# Patient Record
Sex: MALE | Race: WHITE | NOT HISPANIC OR LATINO | Employment: UNEMPLOYED | URBAN - METROPOLITAN AREA
[De-identification: names, ages, dates, MRNs, and addresses within clinical notes are randomized per-mention and may not be internally consistent; named-entity substitution may affect disease eponyms.]

---

## 2023-05-08 LAB
ERYTHROCYTE DISTRIBUTION WIDTH (RATIO) BY AUTOMATED COUNT: 15.1 % (ref 11.5–14.5)
ERYTHROCYTE MEAN CORPUSCULAR HEMOGLOBIN CONCENTRATION (G/DL) BY AUTOMATED: 32.3 G/DL (ref 32–36)
ERYTHROCYTE MEAN CORPUSCULAR VOLUME (FL) BY AUTOMATED COUNT: 93 FL (ref 80–100)
ERYTHROCYTES (10*6/UL) IN BLOOD BY AUTOMATED COUNT: 4.54 X10E12/L (ref 4.5–5.9)
ESTIMATED AVERAGE GLUCOSE FOR HBA1C: 105 MG/DL
HEMATOCRIT (%) IN BLOOD BY AUTOMATED COUNT: 42.1 % (ref 41–52)
HEMOGLOBIN (G/DL) IN BLOOD: 13.6 G/DL (ref 13.5–17.5)
HEMOGLOBIN A1C/HEMOGLOBIN TOTAL IN BLOOD: 5.3 %
LEUKOCYTES (10*3/UL) IN BLOOD BY AUTOMATED COUNT: 9.7 X10E9/L (ref 4.4–11.3)
NRBC (PER 100 WBCS) BY AUTOMATED COUNT: 0 /100 WBC (ref 0–0)
PLATELETS (10*3/UL) IN BLOOD AUTOMATED COUNT: 343 X10E9/L (ref 150–450)

## 2023-05-09 LAB
ALANINE AMINOTRANSFERASE (SGPT) (U/L) IN SER/PLAS: 12 U/L (ref 10–52)
ALBUMIN (G/DL) IN SER/PLAS: 4 G/DL (ref 3.4–5)
ALKALINE PHOSPHATASE (U/L) IN SER/PLAS: 154 U/L (ref 33–136)
ANION GAP IN SER/PLAS: 13 MMOL/L (ref 10–20)
ASPARTATE AMINOTRANSFERASE (SGOT) (U/L) IN SER/PLAS: 13 U/L (ref 9–39)
BILIRUBIN TOTAL (MG/DL) IN SER/PLAS: 0.5 MG/DL (ref 0–1.2)
CALCIUM (MG/DL) IN SER/PLAS: 9.6 MG/DL (ref 8.6–10.6)
CARBON DIOXIDE, TOTAL (MMOL/L) IN SER/PLAS: 26 MMOL/L (ref 21–32)
CHLORIDE (MMOL/L) IN SER/PLAS: 108 MMOL/L (ref 98–107)
CHOLESTEROL (MG/DL) IN SER/PLAS: 107 MG/DL (ref 0–199)
CHOLESTEROL IN HDL (MG/DL) IN SER/PLAS: 29 MG/DL
CHOLESTEROL/HDL RATIO: 3.7
CREATININE (MG/DL) IN SER/PLAS: 1.3 MG/DL (ref 0.5–1.3)
GFR MALE: 61 ML/MIN/1.73M2
GLUCOSE (MG/DL) IN SER/PLAS: 94 MG/DL (ref 74–99)
LDL: 54 MG/DL (ref 0–99)
POTASSIUM (MMOL/L) IN SER/PLAS: 4.6 MMOL/L (ref 3.5–5.3)
PROTEIN TOTAL: 6.4 G/DL (ref 6.4–8.2)
SODIUM (MMOL/L) IN SER/PLAS: 142 MMOL/L (ref 136–145)
THYROTROPIN (MIU/L) IN SER/PLAS BY DETECTION LIMIT <= 0.05 MIU/L: 0.88 MIU/L (ref 0.44–3.98)
TRIGLYCERIDE (MG/DL) IN SER/PLAS: 118 MG/DL (ref 0–149)
UREA NITROGEN (MG/DL) IN SER/PLAS: 19 MG/DL (ref 6–23)
VLDL: 24 MG/DL (ref 0–40)

## 2023-08-23 ENCOUNTER — HOSPITAL ENCOUNTER (OUTPATIENT)
Dept: DATA CONVERSION | Facility: HOSPITAL | Age: 63
Discharge: HOME | End: 2023-08-23
Payer: MEDICARE

## 2023-08-23 DIAGNOSIS — M25.572 PAIN IN LEFT ANKLE AND JOINTS OF LEFT FOOT: ICD-10-CM
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
## 2023-08-23 DIAGNOSIS — Z79.01 LONG TERM (CURRENT) USE OF ANTICOAGULANTS: ICD-10-CM

## 2023-08-23 DIAGNOSIS — S93.402A SPRAIN OF UNSPECIFIED LIGAMENT OF LEFT ANKLE, INITIAL ENCOUNTER: ICD-10-CM

## 2023-10-11 DIAGNOSIS — I25.10 CORONARY ARTERY DISEASE INVOLVING NATIVE HEART, UNSPECIFIED VESSEL OR LESION TYPE, UNSPECIFIED WHETHER ANGINA PRESENT: Primary | ICD-10-CM

## 2023-10-11 RX ORDER — ASPIRIN 81 MG/1
81 TABLET ORAL DAILY
Status: SHIPPED | OUTPATIENT
Start: 2023-10-11

## 2023-10-25 ENCOUNTER — TELEPHONE (OUTPATIENT)
Dept: CARDIOLOGY | Facility: CLINIC | Age: 63
End: 2023-10-25
Payer: MEDICARE

## 2023-11-08 DIAGNOSIS — I10 HYPERTENSION: Primary | ICD-10-CM

## 2023-11-08 DIAGNOSIS — E78.5 HYPERLIPIDEMIA: ICD-10-CM

## 2023-11-08 RX ORDER — CARVEDILOL 25 MG/1
25 TABLET ORAL
COMMUNITY
End: 2023-11-08 | Stop reason: SDUPTHER

## 2023-11-08 RX ORDER — CARVEDILOL 25 MG/1
25 TABLET ORAL
Qty: 180 TABLET | Refills: 3 | Status: SHIPPED | OUTPATIENT
Start: 2023-11-08 | End: 2024-11-07

## 2023-11-08 RX ORDER — ATORVASTATIN CALCIUM 80 MG/1
80 TABLET, FILM COATED ORAL DAILY
Qty: 90 TABLET | Refills: 3 | Status: SHIPPED | OUTPATIENT
Start: 2023-11-08 | End: 2024-11-07

## 2023-11-08 RX ORDER — ATORVASTATIN CALCIUM 80 MG/1
80 TABLET, FILM COATED ORAL DAILY
COMMUNITY
End: 2023-11-08 | Stop reason: SDUPTHER

## 2023-12-01 ENCOUNTER — TELEPHONE (OUTPATIENT)
Dept: VASCULAR MEDICINE | Facility: CLINIC | Age: 63
End: 2023-12-01
Payer: MEDICARE

## 2023-12-22 ENCOUNTER — HOSPITAL ENCOUNTER (OUTPATIENT)
Dept: VASCULAR MEDICINE | Facility: CLINIC | Age: 63
Discharge: HOME | End: 2023-12-22
Payer: MEDICARE

## 2023-12-22 DIAGNOSIS — Z95.828 PRESENCE OF OTHER VASCULAR IMPLANTS AND GRAFTS: ICD-10-CM

## 2023-12-22 DIAGNOSIS — I73.9 PERIPHERAL VASCULAR DISEASE, UNSPECIFIED (CMS-HCC): ICD-10-CM

## 2023-12-22 PROCEDURE — 93978 VASCULAR STUDY: CPT

## 2023-12-22 PROCEDURE — 93978 VASCULAR STUDY: CPT | Performed by: INTERNAL MEDICINE

## 2023-12-27 ENCOUNTER — APPOINTMENT (OUTPATIENT)
Dept: VASCULAR SURGERY | Facility: CLINIC | Age: 63
End: 2023-12-27
Payer: MEDICARE

## 2024-01-03 ENCOUNTER — OFFICE VISIT (OUTPATIENT)
Dept: VASCULAR SURGERY | Facility: CLINIC | Age: 64
End: 2024-01-03
Payer: MEDICARE

## 2024-01-03 VITALS
HEIGHT: 72 IN | SYSTOLIC BLOOD PRESSURE: 128 MMHG | WEIGHT: 197 LBS | DIASTOLIC BLOOD PRESSURE: 88 MMHG | BODY MASS INDEX: 26.68 KG/M2 | HEART RATE: 80 BPM | OXYGEN SATURATION: 96 %

## 2024-01-03 DIAGNOSIS — Z95.828 HX OF AORTO-FEMORAL BYPASS: Primary | ICD-10-CM

## 2024-01-03 DIAGNOSIS — I73.9 PAD (PERIPHERAL ARTERY DISEASE) (CMS-HCC): ICD-10-CM

## 2024-01-03 PROCEDURE — 99213 OFFICE O/P EST LOW 20 MIN: CPT | Performed by: NURSE PRACTITIONER

## 2024-01-03 PROCEDURE — 1126F AMNT PAIN NOTED NONE PRSNT: CPT | Performed by: NURSE PRACTITIONER

## 2024-01-03 PROCEDURE — 1159F MED LIST DOCD IN RCRD: CPT | Performed by: NURSE PRACTITIONER

## 2024-01-03 PROCEDURE — 1160F RVW MEDS BY RX/DR IN RCRD: CPT | Performed by: NURSE PRACTITIONER

## 2024-01-03 ASSESSMENT — PAIN SCALES - GENERAL: PAINLEVEL: 0-NO PAIN

## 2024-01-03 NOTE — PATIENT INSTRUCTIONS
Noam, congratulations on cutting back on your smoking!  I hope that you can eventually stop smoking completely.    Your bypasses are open and we will continue to monitor your blood flow.  If you should develop any symptoms, please call us ASAP.    Follow-up in 6 months.

## 2024-01-03 NOTE — PROGRESS NOTES
History Of Present Illness  Dayo Quesada is a 63 y.o. male presenting with a complicated vascular history including aortic reconstruction with a cryo graft secondary to an infected aortobifemoral bypass. He underwent aorto left SFA and aorto right profunda bypass using cryo graft completed in April 2022 by Dr. Garcia. The patient remains on doxycycline daily for suppressive antibiotics.  Patient returns today for routine follow-up.  He denies any symptoms of claudication, ischemic or open wounds or sores.  Of note, patient reports that he continues to cut back on his smoking but has not completely quit yet.       Past Medical History  PAD, tobacco abuse    Surgical History  Past Surgical History:   Procedure Laterality Date    CT ABDOMEN PELVIS ANGIOGRAM W AND/OR WO IV CONTRAST  7/28/2015    CT ABDOMEN PELVIS ANGIOGRAM W AND/OR WO IV CONTRAST [facility]    CT ABDOMEN PELVIS ANGIOGRAM W AND/OR WO IV CONTRAST  11/7/2017    CT ABDOMEN PELVIS ANGIOGRAM W AND/OR WO IV CONTRAST [facility]    CT ABDOMEN PELVIS ANGIOGRAM W AND/OR WO IV CONTRAST  3/24/2022    CT ABDOMEN PELVIS ANGIOGRAM W AND/OR WO IV CONTRAST [facility]    CT AORTA AND BILATERAL ILIOFEMORAL RUNOFF ANGIOGRAM W AND/OR WO IV CONTRAST  4/4/2022    CT AORTA AND BILATERAL ILIOFEMORAL RUNOFF ANGIOGRAM W AND/OR WO IV CONTRAST 4/4/2022 [facility] ANCILLARY LEGACY    CT AORTA AND BILATERAL ILIOFEMORAL RUNOFF ANGIOGRAM W AND/OR WO IV CONTRAST  9/12/2013    CT AORTA AND BILATERAL ILIOFEMORAL RUNOFF ANGIOGRAM W AND/OR WO IV CONTRAST [facility]    CT AORTA AND BILATERAL ILIOFEMORAL RUNOFF ANGIOGRAM W AND/OR WO IV CONTRAST  10/29/2013    CT AORTA AND BILATERAL ILIOFEMORAL RUNOFF ANGIOGRAM W AND/OR WO IV CONTRAST [facility]    CT AORTA AND BILATERAL ILIOFEMORAL RUNOFF ANGIOGRAM W AND/OR WO IV CONTRAST  10/5/2016    CT AORTA AND BILATERAL ILIOFEMORAL RUNOFF ANGIOGRAM W AND/OR WO IV CONTRAST [facility]    CT AORTA AND  BILATERAL ILIOFEMORAL RUNOFF ANGIOGRAM W AND/OR WO IV CONTRAST  12/14/2016    CT AORTA AND BILATERAL ILIOFEMORAL RUNOFF ANGIOGRAM W AND/OR WO IV CONTRAST [facility] INPATIENT LEGACY    CT PELVIS ANGIO W AND WO IV CONTRAST  9/1/2018    CT PELVIS ANGIO W AND WO IV CONTRAST [facility] EMERGENCY LEGACY    IR ANGIOGRAM LOWER EXTREMITY BILATERAL Bilateral 11/5/2015    IR ANGIOGRAM LOWER EXTREMITY BILATERAL [facility] INPATIENT LEGACY    IR ANGIOGRAM LOWER EXTREMITY LEFT Left 10/28/2015    IR ANGIOGRAM LOWER EXTREMITY LEFT [facility]    IR ANGIOGRAM LOWER EXTREMITY LEFT Left 11/10/2017    IR ANGIOGRAM LOWER EXTREMITY LEFT [facility] INPATIENT LEGACY    IR ANGIOGRAM LOWER EXTREMITY LEFT Left 5/3/2019    IR ANGIOGRAM LOWER EXTREMITY LEFT [facility] INPATIENT LEGACY    MR ANGIO ABDOMEN W AND WO IV CONTRAST  5/18/2022    MR ABDOMEN ANGIO W AND WO IV CONTRAST [facility] INPATIENT LEGACY    OTHER SURGICAL HISTORY  07/31/2017    Angioplasty    OTHER SURGICAL HISTORY  07/31/2017    Creation Of Pericardial Window    US GUIDED ABSCESS DRAIN  5/18/2022    US GUIDED ABSCESS DRAIN [facility] INPATIENT LEGACY         Social History  He reports that he has been smoking cigarettes. He does not have any smokeless tobacco history on file. No history on file for alcohol use and drug use.    Family History  No family history on file.     Allergies  Patient has no allergy information on record.    Review of Systems    CONSTITUTIONAL: Denies weight loss, fever and chills.    HEENT: Denies changes in vision and hearing.    RESPIRATORY: Denies SOB and cough.    CV: Denies palpitations and CP.    GI: Denies abdominal pain, nausea, vomiting and diarrhea.    : Denies dysuria and urinary frequency.    MSK: Denies myalgia and joint pain.    SKIN: Denies rash and pruritus.    VASC: Denies claudication, ischemic rest pain, or open wounds or sores    NEUROLOGICAL: Denies headache and syncope.    PSYCHIATRIC: Denies recent changes in mood. Denies anxiety and depression.     Physical  Exam    General: Pleasant, cooperative, alert and oriented x 3.  HEENT: Head is atraumatic, normocephalic. PERRL.  Skin: Warm and dry, no rashes or lesions  Pulses: Palpable radial and brachial pulses bilaterally. Palpable femoral and popliteal pulses, right dorsalis pedis pulse. Palpable left posterior tibial pulse. No open wounds or sores.  Extremities: No edema or cyanosis  Neuro: Moves all extremities spontaneously, no focal deficits  Psych: affect normal, clear speech          Last Recorded Vitals  /88 (BP Location: Right arm, Patient Position: Sitting)   Pulse 80   Wt 89.4 kg (197 lb)   SpO2 96%     Relevant Results    Current Outpatient Medications   Medication Instructions    acetaminophen (TYLENOL) 325 mg, oral, Every 6 hours PRN    atorvastatin (LIPITOR) 80 mg, oral, Daily    carvedilol (COREG) 25 mg, oral, 2 times daily with meals    doxycycline (MONODOX) 100 mg, oral, 2 times daily, Take with at least 8 ounces (large glass) of water, do not lie down for 30 minutes after    methocarbamol (ROBAXIN) 500 mg, oral, 3 times daily PRN    polyethylene glycol (GLYCOLAX, MIRALAX) 17 g, oral, Once    sacubitriL-valsartan (Entresto) 49-51 mg tablet 1 tablet, oral, Every 12 hours    traMADol (ULTRAM) 50 mg, oral, Every 6 hours PRN              Assessment/Plan   Peripheral arterial disease  History of aortic reconstruction with aorto right profunda and aorto left SFA bypass    Peripheral arterial disease-I reviewed the patient's duplex today which noted that the aorto right profunda and aorto left SFA bypasses are patent.  Elevated velocities in the right limb distal anastomosis as well as the outflow artery.  I do not have an JOEL to compare from last study.  Patient asymptomatic at this point.  I did discuss the patient in detail with Dr. Connelly who recommended continued monitoring.  Follow-up in 6 months with an aortic duplex and JOEL.  Patient does take Xarelto, aspirin, and atorvastatin.  Advised to  continue        Jose Farmer, APRN-CNP

## 2024-02-05 DIAGNOSIS — T85.79XA: ICD-10-CM

## 2024-02-05 DIAGNOSIS — I50.9 HEART FAILURE, UNSPECIFIED (MULTI): Primary | ICD-10-CM

## 2024-02-05 RX ORDER — SACUBITRIL AND VALSARTAN 49; 51 MG/1; MG/1
1 TABLET, FILM COATED ORAL EVERY 12 HOURS
Qty: 180 TABLET | Refills: 3 | Status: SHIPPED | OUTPATIENT
Start: 2024-02-05 | End: 2025-02-04

## 2024-02-05 RX ORDER — DOXYCYCLINE 100 MG/1
100 CAPSULE ORAL 2 TIMES DAILY
Qty: 60 CAPSULE | Refills: 1 | Status: SHIPPED | OUTPATIENT
Start: 2024-02-05 | End: 2024-04-01 | Stop reason: SDUPTHER

## 2024-02-05 RX ORDER — DOXYCYCLINE 100 MG/1
100 CAPSULE ORAL 2 TIMES DAILY
COMMUNITY
End: 2024-02-05 | Stop reason: SDUPTHER

## 2024-03-11 ENCOUNTER — APPOINTMENT (OUTPATIENT)
Dept: CARDIOLOGY | Facility: CLINIC | Age: 64
End: 2024-03-11
Payer: MEDICARE

## 2024-03-29 PROBLEM — T82.7XXA VASCULAR GRAFT INFECTION (CMS-HCC): Status: ACTIVE | Noted: 2024-03-29

## 2024-03-29 PROBLEM — E78.5 HYPERLIPIDEMIA: Status: ACTIVE | Noted: 2024-03-29

## 2024-03-29 PROBLEM — T14.8XXA HEMATOMA: Status: ACTIVE | Noted: 2024-03-29

## 2024-03-29 PROBLEM — I25.10 ARTERIOSCLEROSIS OF CORONARY ARTERY: Status: ACTIVE | Noted: 2017-03-22

## 2024-03-29 PROBLEM — M25.511 BILATERAL SHOULDER PAIN: Status: ACTIVE | Noted: 2024-03-29

## 2024-03-29 PROBLEM — S93.409A SPRAIN OF ANKLE: Status: ACTIVE | Noted: 2023-08-23

## 2024-03-29 PROBLEM — F20.81: Status: ACTIVE | Noted: 2024-03-29

## 2024-03-29 PROBLEM — F32.A DEPRESSION: Status: ACTIVE | Noted: 2024-03-29

## 2024-03-29 PROBLEM — I73.9 PERIPHERAL ARTERIAL DISEASE (CMS-HCC): Status: ACTIVE | Noted: 2017-03-22

## 2024-03-29 PROBLEM — R40.1 CLOUDED CONSCIOUSNESS: Status: ACTIVE | Noted: 2024-03-29

## 2024-03-29 PROBLEM — I37.9 PULMONARY VALVE DISORDER: Status: ACTIVE | Noted: 2024-03-29

## 2024-03-29 PROBLEM — I82.409 DVT (DEEP VENOUS THROMBOSIS) (MULTI): Status: ACTIVE | Noted: 2024-03-29

## 2024-03-29 PROBLEM — M25.579 PAIN IN JOINT INVOLVING ANKLE AND FOOT: Status: ACTIVE | Noted: 2023-08-23

## 2024-03-29 PROBLEM — R55 SYNCOPE: Status: ACTIVE | Noted: 2024-03-29

## 2024-03-29 PROBLEM — E88.09 HYPOALBUMINEMIA: Status: ACTIVE | Noted: 2024-03-29

## 2024-03-29 PROBLEM — R06.00 DYSPNEA: Status: ACTIVE | Noted: 2024-03-29

## 2024-03-29 PROBLEM — I25.2 OLD MYOCARDIAL INFARCT: Status: ACTIVE | Noted: 2024-03-29

## 2024-03-29 PROBLEM — M54.9 BACKACHE: Status: ACTIVE | Noted: 2024-03-29

## 2024-03-29 PROBLEM — M25.559 HIP PAIN: Status: ACTIVE | Noted: 2024-03-29

## 2024-03-29 PROBLEM — M79.669 LOWER LEG PAIN: Status: ACTIVE | Noted: 2024-03-29

## 2024-03-29 PROBLEM — R07.9 CHEST PAIN: Status: ACTIVE | Noted: 2024-03-29

## 2024-03-29 PROBLEM — I21.9 MYOCARDIAL INFARCTION (MULTI): Status: ACTIVE | Noted: 2024-03-29

## 2024-03-29 PROBLEM — T82.7XXS: Status: ACTIVE | Noted: 2024-03-29

## 2024-03-29 PROBLEM — I42.9 CARDIOMYOPATHY (MULTI): Status: ACTIVE | Noted: 2024-03-29

## 2024-03-29 PROBLEM — L03.90 CELLULITIS: Status: ACTIVE | Noted: 2024-03-29

## 2024-03-29 PROBLEM — I65.23 CAROTID STENOSIS, ASYMPTOMATIC, BILATERAL: Status: ACTIVE | Noted: 2024-03-29

## 2024-03-29 PROBLEM — I50.22 CHRONIC SYSTOLIC CONGESTIVE HEART FAILURE (MULTI): Status: ACTIVE | Noted: 2017-03-22

## 2024-03-29 PROBLEM — M25.512 BILATERAL SHOULDER PAIN: Status: ACTIVE | Noted: 2024-03-29

## 2024-03-29 PROBLEM — K40.90 HERNIA, INGUINAL: Status: ACTIVE | Noted: 2024-03-29

## 2024-03-29 PROBLEM — R10.9 ABDOMINAL PAIN: Status: ACTIVE | Noted: 2024-03-29

## 2024-03-29 PROBLEM — J18.9 PNEUMONIA: Status: ACTIVE | Noted: 2024-03-29

## 2024-03-29 PROBLEM — R10.30 INGUINAL PAIN: Status: ACTIVE | Noted: 2024-03-29

## 2024-03-29 PROBLEM — I31.39 PERICARDIAL EFFUSION (HHS-HCC): Status: ACTIVE | Noted: 2024-03-29

## 2024-03-29 PROBLEM — T14.8XXA LOCAL INFECTION OF WOUND: Status: ACTIVE | Noted: 2024-03-29

## 2024-03-29 PROBLEM — Z98.890 HISTORY OF CARDIOVASCULAR SURGERY: Status: ACTIVE | Noted: 2024-03-29

## 2024-03-29 PROBLEM — F17.200 NICOTINE DEPENDENCE, UNSPECIFIED, UNCOMPLICATED: Status: ACTIVE | Noted: 2023-05-08

## 2024-03-29 PROBLEM — I70.203: Status: ACTIVE | Noted: 2024-03-29

## 2024-03-29 PROBLEM — R60.9 EDEMA: Status: ACTIVE | Noted: 2024-03-29

## 2024-03-29 PROBLEM — R11.2 NAUSEA & VOMITING: Status: ACTIVE | Noted: 2024-03-29

## 2024-03-29 PROBLEM — T73.3XXA EXHAUSTION DUE TO EXCESSIVE EXERTION: Status: ACTIVE | Noted: 2023-08-23

## 2024-03-29 PROBLEM — Z95.828 HISTORY OF AORTO-FEMORAL BYPASS: Status: ACTIVE | Noted: 2023-12-22

## 2024-03-29 PROBLEM — H93.19 EAR RINGING: Status: ACTIVE | Noted: 2024-03-29

## 2024-03-29 PROBLEM — Z98.61 HISTORY OF PERCUTANEOUS TRANSLUMINAL CORONARY ANGIOPLASTY: Status: ACTIVE | Noted: 2024-03-29

## 2024-03-29 PROBLEM — T85.79XA: Status: ACTIVE | Noted: 2024-03-29

## 2024-03-29 PROBLEM — I10 ESSENTIAL HYPERTENSION: Status: ACTIVE | Noted: 2017-03-22

## 2024-03-29 PROBLEM — R05.9 COUGH: Status: ACTIVE | Noted: 2024-03-29

## 2024-03-29 PROBLEM — Z95.5 PRESENCE OF CORONARY ANGIOPLASTY IMPLANT AND GRAFT: Status: ACTIVE | Noted: 2023-01-20

## 2024-03-29 PROBLEM — R10.9 FLANK PAIN: Status: ACTIVE | Noted: 2024-03-29

## 2024-03-29 PROBLEM — R42 DIZZINESS: Status: ACTIVE | Noted: 2024-03-29

## 2024-03-29 PROBLEM — L08.9 LOCAL INFECTION OF WOUND: Status: ACTIVE | Noted: 2024-03-29

## 2024-03-29 PROBLEM — M54.50 ACUTE LOW BACK PAIN: Status: ACTIVE | Noted: 2024-03-29

## 2024-03-29 RX ORDER — HALOPERIDOL DECANOATE 100 MG/ML
INJECTION INTRAMUSCULAR
COMMUNITY
Start: 2024-01-09

## 2024-03-29 RX ORDER — FLUOXETINE HYDROCHLORIDE 20 MG/1
CAPSULE ORAL
COMMUNITY
Start: 2024-01-30 | End: 2024-05-24 | Stop reason: ALTCHOICE

## 2024-03-29 RX ORDER — OLANZAPINE 15 MG/1
TABLET ORAL
COMMUNITY
Start: 2024-01-18

## 2024-03-29 RX ORDER — RIVAROXABAN 20 MG/1
TABLET, FILM COATED ORAL
COMMUNITY
Start: 2024-01-30 | End: 2024-05-01 | Stop reason: SDUPTHER

## 2024-03-29 RX ORDER — SACUBITRIL AND VALSARTAN 97; 103 MG/1; MG/1
TABLET, FILM COATED ORAL
COMMUNITY
Start: 2024-01-04

## 2024-04-01 ENCOUNTER — OFFICE VISIT (OUTPATIENT)
Dept: CARDIOLOGY | Facility: CLINIC | Age: 64
End: 2024-04-01
Payer: MEDICARE

## 2024-04-01 VITALS
WEIGHT: 207 LBS | BODY MASS INDEX: 28.04 KG/M2 | HEIGHT: 72 IN | HEART RATE: 72 BPM | OXYGEN SATURATION: 99 % | DIASTOLIC BLOOD PRESSURE: 69 MMHG | SYSTOLIC BLOOD PRESSURE: 104 MMHG

## 2024-04-01 DIAGNOSIS — I50.9 CONGESTIVE HEART FAILURE, UNSPECIFIED HF CHRONICITY, UNSPECIFIED HEART FAILURE TYPE (MULTI): Primary | ICD-10-CM

## 2024-04-01 DIAGNOSIS — R94.31 ABNORMAL ELECTROCARDIOGRAM (ECG) (EKG): ICD-10-CM

## 2024-04-01 DIAGNOSIS — T85.79XA: Primary | ICD-10-CM

## 2024-04-01 PROCEDURE — 3074F SYST BP LT 130 MM HG: CPT | Performed by: NURSE PRACTITIONER

## 2024-04-01 PROCEDURE — 1126F AMNT PAIN NOTED NONE PRSNT: CPT | Performed by: NURSE PRACTITIONER

## 2024-04-01 PROCEDURE — 1160F RVW MEDS BY RX/DR IN RCRD: CPT | Performed by: NURSE PRACTITIONER

## 2024-04-01 PROCEDURE — 3078F DIAST BP <80 MM HG: CPT | Performed by: NURSE PRACTITIONER

## 2024-04-01 PROCEDURE — 99214 OFFICE O/P EST MOD 30 MIN: CPT | Performed by: NURSE PRACTITIONER

## 2024-04-01 PROCEDURE — 1159F MED LIST DOCD IN RCRD: CPT | Performed by: NURSE PRACTITIONER

## 2024-04-01 RX ORDER — DOXYCYCLINE 100 MG/1
100 CAPSULE ORAL 2 TIMES DAILY
Qty: 60 CAPSULE | Refills: 1 | OUTPATIENT
Start: 2024-04-01 | End: 2024-05-10

## 2024-04-01 RX ORDER — FLUOXETINE 20 MG/1
TABLET ORAL
COMMUNITY
Start: 2024-03-08

## 2024-04-01 ASSESSMENT — PAIN SCALES - GENERAL: PAINLEVEL: 0-NO PAIN

## 2024-04-01 ASSESSMENT — ENCOUNTER SYMPTOMS
GASTROINTESTINAL NEGATIVE: 1
RESPIRATORY NEGATIVE: 1
CONSTITUTIONAL NEGATIVE: 1
CARDIOVASCULAR NEGATIVE: 1
MUSCULOSKELETAL NEGATIVE: 1
NEUROLOGICAL NEGATIVE: 1

## 2024-04-01 ASSESSMENT — PATIENT HEALTH QUESTIONNAIRE - PHQ9
SUM OF ALL RESPONSES TO PHQ9 QUESTIONS 1 AND 2: 0
1. LITTLE INTEREST OR PLEASURE IN DOING THINGS: NOT AT ALL
2. FEELING DOWN, DEPRESSED OR HOPELESS: NOT AT ALL

## 2024-04-01 ASSESSMENT — COLUMBIA-SUICIDE SEVERITY RATING SCALE - C-SSRS
2. HAVE YOU ACTUALLY HAD ANY THOUGHTS OF KILLING YOURSELF?: NO
1. IN THE PAST MONTH, HAVE YOU WISHED YOU WERE DEAD OR WISHED YOU COULD GO TO SLEEP AND NOT WAKE UP?: NO
6. HAVE YOU EVER DONE ANYTHING, STARTED TO DO ANYTHING, OR PREPARED TO DO ANYTHING TO END YOUR LIFE?: NO

## 2024-04-01 NOTE — PROGRESS NOTES
Chief Complaint:   Follow-up    History Of Present Illness:    .Mr Quesada returns in follow up.  Denies chest pain, sob, palpitations or pedal edema.           Last Recorded Vitals:  Blood pressure 104/69, pulse 72, height 1.829 m (6'), weight 93.9 kg (207 lb), SpO2 99 %.     Past Medical History:  No past medical history on file.     Past Surgical History:  Past Surgical History:   Procedure Laterality Date    CT ABDOMEN PELVIS ANGIOGRAM W AND/OR WO IV CONTRAST  7/28/2015    CT ABDOMEN PELVIS ANGIOGRAM W AND/OR WO IV CONTRAST [facility]    CT ABDOMEN PELVIS ANGIOGRAM W AND/OR WO IV CONTRAST  11/7/2017    CT ABDOMEN PELVIS ANGIOGRAM W AND/OR WO IV CONTRAST [facility]    CT ABDOMEN PELVIS ANGIOGRAM W AND/OR WO IV CONTRAST  3/24/2022    CT ABDOMEN PELVIS ANGIOGRAM W AND/OR WO IV CONTRAST [facility]    CT AORTA AND BILATERAL ILIOFEMORAL RUNOFF ANGIOGRAM W AND/OR WO IV CONTRAST  4/4/2022    CT AORTA AND BILATERAL ILIOFEMORAL RUNOFF ANGIOGRAM W AND/OR WO IV CONTRAST 4/4/2022 [facility] ANCILLARY LEGACY    CT AORTA AND BILATERAL ILIOFEMORAL RUNOFF ANGIOGRAM W AND/OR WO IV CONTRAST  9/12/2013    CT AORTA AND BILATERAL ILIOFEMORAL RUNOFF ANGIOGRAM W AND/OR WO IV CONTRAST [facility]    CT AORTA AND BILATERAL ILIOFEMORAL RUNOFF ANGIOGRAM W AND/OR WO IV CONTRAST  10/29/2013    CT AORTA AND BILATERAL ILIOFEMORAL RUNOFF ANGIOGRAM W AND/OR WO IV CONTRAST [facility]    CT AORTA AND BILATERAL ILIOFEMORAL RUNOFF ANGIOGRAM W AND/OR WO IV CONTRAST  10/5/2016    CT AORTA AND BILATERAL ILIOFEMORAL RUNOFF ANGIOGRAM W AND/OR WO IV CONTRAST [facility]    CT AORTA AND BILATERAL ILIOFEMORAL RUNOFF ANGIOGRAM W AND/OR WO IV CONTRAST  12/14/2016    CT AORTA AND BILATERAL ILIOFEMORAL RUNOFF ANGIOGRAM W AND/OR WO IV CONTRAST [facility]    CT PELVIS ANGIO W AND WO IV CONTRAST  9/1/2018    CT PELVIS ANGIO W AND WO IV CONTRAST [facility]    IR ANGIOGRAM LOWER EXTREMITY BILATERAL  Bilateral 11/5/2015    IR ANGIOGRAM LOWER EXTREMITY BILATERAL [facility] INPATIENT LEGACY    IR ANGIOGRAM LOWER EXTREMITY LEFT Left 10/28/2015    IR ANGIOGRAM LOWER EXTREMITY LEFT [facility] INPATIENT LEGACY    IR ANGIOGRAM LOWER EXTREMITY LEFT Left 11/10/2017    IR ANGIOGRAM LOWER EXTREMITY LEFT [facility] INPATIENT LEGACY    IR ANGIOGRAM LOWER EXTREMITY LEFT Left 5/3/2019    IR ANGIOGRAM LOWER EXTREMITY LEFT [facility] INPATIENT LEGACY    MR ANGIO ABDOMEN W AND WO IV CONTRAST  5/18/2022    MR ABDOMEN ANGIO W AND WO IV CONTRAST [facility] INPATIENT LEGACY    OTHER SURGICAL HISTORY  07/31/2017    Angioplasty    OTHER SURGICAL HISTORY  07/31/2017    Creation Of Pericardial Window    US GUIDED ABSCESS DRAIN  5/18/2022    US GUIDED ABSCESS DRAIN [facility] INPATIENT LEGACY       Social History:  Social History     Socioeconomic History    Marital status: Single     Spouse name: None    Number of children: None    Years of education: None    Highest education level: None   Occupational History    None   Tobacco Use    Smoking status: Every Day     Types: Cigarettes    Smokeless tobacco: None   Substance and Sexual Activity    Alcohol use: None    Drug use: None    Sexual activity: None   Other Topics Concern    None   Social History Narrative    None     Social Determinants of Health     Financial Resource Strain: Not on file   Food Insecurity: Not on file   Transportation Needs: Not on file   Physical Activity: Not on file   Stress: Not on file   Social Connections: Not on file   Intimate Partner Violence: Not on file   Housing Stability: Not on file       Family History:  No family history on file.      Allergies:  Clarithromycin    Outpatient Medications:  Current Outpatient Medications   Medication Sig Dispense Refill    acetaminophen (Tylenol) 325 mg tablet Take 1 tablet (325 mg) by mouth every 6 hours if needed.      atorvastatin (Lipitor) 80 mg tablet Take 1 tablet (80 mg) by mouth once daily. 90 tablet 3    carvedilol (Coreg) 25 mg tablet Take 1 tablet  (25 mg) by mouth 2 times a day with meals. 180 tablet 3    doxycycline (Monodox) 100 mg capsule Take 1 capsule (100 mg) by mouth 2 times a day. Take with at least 8 ounces (large glass) of water, do not lie down for 30 minutes after 60 capsule 1    Entresto  mg tablet       FLUoxetine (PROzac) 20 mg capsule       FLUoxetine (PROzac) 20 mg tablet       haloperidol decanoate (Haldol Decanoate) 100 mg/mL injection       methocarbamol (Robaxin) 500 mg tablet Take 1 tablet (500 mg) by mouth 3 times a day as needed for muscle spasms.      OLANZapine (ZyPREXA) 15 mg tablet       polyethylene glycol (Glycolax, Miralax) 17 gram/dose powder Take 17 g by mouth 1 time.      sacubitriL-valsartan (Entresto) 49-51 mg tablet Take 1 tablet by mouth every 12 hours. 180 tablet 3    traMADol (Ultram) 50 mg tablet Take 1 tablet (50 mg) by mouth every 6 hours if needed.      Xarelto 20 mg tablet        Current Facility-Administered Medications   Medication Dose Route Frequency Provider Last Rate Last Admin    aspirin EC tablet 81 mg  81 mg oral Daily Yash Jones MD            Physical Exam:  Cardiovascular:      PMI at left midclavicular line. Normal rate. Regular rhythm. Normal S1. Normal S2.       Murmurs: There is a grade 1/6 systolic murmur.      No gallop.  No click. No rub.   Pulses:     Intact distal pulses.   Edema:     Peripheral edema absent.         ROS:  Review of Systems   Constitutional: Negative.   Cardiovascular: Negative.    Respiratory: Negative.     Skin: Negative.    Musculoskeletal: Negative.    Gastrointestinal: Negative.    Genitourinary: Negative.    Neurological: Negative.           Last Labs:  CBC -  Lab Results   Component Value Date    WBC 9.7 05/08/2023    HGB 13.6 05/08/2023    HCT 42.1 05/08/2023    MCV 93 05/08/2023     05/08/2023       CMP -  Lab Results   Component Value Date    CALCIUM 9.6 05/08/2023    PHOS 3.9 05/25/2022    PROT 6.4 05/08/2023    ALBUMIN 4.0 05/08/2023    ALBUMIN 3.5  08/24/2020    AST 13 05/08/2023    ALT 12 05/08/2023    ALKPHOS 154 (H) 05/08/2023    BILITOT 0.5 05/08/2023       LIPID PANEL -   Lab Results   Component Value Date    CHOL 107 05/08/2023    TRIG 118 05/08/2023    HDL 29.0 (A) 05/08/2023    CHHDL 3.7 05/08/2023    LDLF 54 05/08/2023    VLDL 24 05/08/2023    NHDL 120 07/23/2018       RENAL FUNCTION PANEL -   Lab Results   Component Value Date    GLUCOSE 94 05/08/2023     05/08/2023    K 4.6 05/08/2023     (H) 05/08/2023    CO2 26 05/08/2023    ANIONGAP 13 05/08/2023    BUN 19 05/08/2023    CREATININE 1.30 05/08/2023    GFRMALE 61 05/08/2023    CALCIUM 9.6 05/08/2023    PHOS 3.9 05/25/2022    ALBUMIN 4.0 05/08/2023    ALBUMIN 3.5 08/24/2020        Lab Results   Component Value Date    HGBA1C 5.3 05/08/2023         Assessment/Plan   Problem List Items Addressed This Visit    None    Assessment:     1. CAD. PTCA and bare metal stent to LAD 08/[facility] Patient did have a screening outpatient IV pharmacological nuclear stress test performed on 12/21/2021 which demonstrated a moderate to large defect that was fixed consistent with scar involving the apex and inferior wall with nontransmural fixed septal defect again consistent with scarring. The lateral wall appeared to be normal and there was no stress-induced reversible myocardial ischemia. The patient denies any anginal type chest discomfort. Prior to the stress test the patient also had an echocardiogram performed on 10/25/2021 and demonstrating moderate hypokinesis of the mid and distal anteroseptal wall with an LV ejection fraction of 45%. He was found to have a bicuspid aortic valve with a mild degree of stenosis peak systolic pressure gradient of 28 mmHg. There was mild dilatation of the aortic root measuring 3.9 cm. 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     2. Peripheral vascular disease with left femoropopliteal  PTFE bypass/stenting of left superficial femoral artery. 11/2017 at [facility] with Dr Arellano. CT angio 09/2018 showed a well patent aortobifemoral bypass graft extending from the infrarenal abdominal aorta down to proximal superficial femoral arteries bilaterally. 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 On asa 81 mg daily. Will continue Xarelto 20 mg daily.      3. Hyperlipidemia. Last FLP done 07/2018 showed chol 146, HDL 26, LDL 76, trig 220. Continue atorvastatin 80 mg daily. Had labwork 08/2020. FLP done 03/2021 showed chol 145, HDL 30, LDL 82, trig 164. The have patient will have lab work repeated including a lipid panel CBC CMP and glycohemoglobin.     4. Ischemic cardiomyopathy/Bicuspid AV. Last echo showed LV mildly dilated, EF 50--55%, mild AV stenosis. Continue carvedilol and entresto as well as furosemide. Echo done today 10/25/2021 showed EF 45%, mild AI, with gradient 28 mm Hg, moderate hypokinesis of the mid and distal anteroseptal wall, mild diastolic dysfunction, possible bicuspid AV . Plan to repeat echo every two years. The patient's dose of Entresto will be increased from 49/51 mg twice daily to 97/103 mg twice daily. Continue carvedilol 12.5 mg twice daily unchanged along with the Lasix 20 mg daily. Echo done 09/2023 showed EF 55%, mild hypokinesis of the mid to distal half of the anteroseptal wall, bicuspid AV, moderate AS with 53 mm Hg gradient, 4.0 cm aortic dilation, evidence of diastolic dysfunction. Will likely need a new valve in the next 5 years. Will monitor echoes every 15-18 months.     5. Schizophrenia. On meds.     6. Hypertension. Well controlled today.     7. Smoking. 1-2 ppd. Cessation encouraged.     8. Hx of covid-19 05/2021 and both vaccines.     9.? Left inguinal hernia. Patient developed pain in a slight area  of swelling in the left inguinal region after throwing some material into a dumpster. He will be referred to general surgery to assess for possible hernia.     10. AAA repair with graft infection. Patient had aortic endograft s/p exploration, aortic reconstruction and left popliteal obturator bypass with right profunda bypass with SFA graft 04/2022. He was seen for antibiotic treatment, but left AMA. Apparently did see ID for antibiotic treatment as outpatient.          Ellie Driscoll, APRN-CNP

## 2024-05-01 DIAGNOSIS — I82.409 ACUTE EMBOLISM AND THROMBOSIS OF UNSPECIFIED DEEP VEINS OF UNSPECIFIED LOWER EXTREMITY (MULTI): Primary | ICD-10-CM

## 2024-05-01 RX ORDER — RIVAROXABAN 20 MG/1
20 TABLET, FILM COATED ORAL
Qty: 90 TABLET | Refills: 3 | Status: SHIPPED | OUTPATIENT
Start: 2024-05-01 | End: 2025-05-01

## 2024-05-10 ENCOUNTER — APPOINTMENT (OUTPATIENT)
Dept: RADIOLOGY | Facility: HOSPITAL | Age: 64
End: 2024-05-10
Payer: MEDICARE

## 2024-05-10 ENCOUNTER — HOSPITAL ENCOUNTER (EMERGENCY)
Facility: HOSPITAL | Age: 64
Discharge: HOME | End: 2024-05-10
Attending: EMERGENCY MEDICINE
Payer: MEDICARE

## 2024-05-10 VITALS
WEIGHT: 199.5 LBS | DIASTOLIC BLOOD PRESSURE: 81 MMHG | RESPIRATION RATE: 18 BRPM | SYSTOLIC BLOOD PRESSURE: 115 MMHG | HEART RATE: 76 BPM | HEIGHT: 72 IN | BODY MASS INDEX: 27.02 KG/M2 | TEMPERATURE: 98.7 F | OXYGEN SATURATION: 96 %

## 2024-05-10 DIAGNOSIS — J18.9 ATYPICAL PNEUMONIA: Primary | ICD-10-CM

## 2024-05-10 DIAGNOSIS — T85.79XA: ICD-10-CM

## 2024-05-10 LAB
ANION GAP SERPL CALC-SCNC: 10 MMOL/L
APPEARANCE UR: CLEAR
BASOPHILS # BLD AUTO: 0.03 X10*3/UL (ref 0–0.1)
BASOPHILS NFR BLD AUTO: 0.6 %
BILIRUB UR STRIP.AUTO-MCNC: NEGATIVE MG/DL
BUN SERPL-MCNC: 24 MG/DL (ref 8–25)
CALCIUM SERPL-MCNC: 9 MG/DL (ref 8.5–10.4)
CHLORIDE SERPL-SCNC: 106 MMOL/L (ref 97–107)
CO2 SERPL-SCNC: 25 MMOL/L (ref 24–31)
COLOR UR: YELLOW
CREAT SERPL-MCNC: 1.4 MG/DL (ref 0.4–1.6)
EGFRCR SERPLBLD CKD-EPI 2021: 55 ML/MIN/1.73M*2
EOSINOPHIL # BLD AUTO: 0.28 X10*3/UL (ref 0–0.7)
EOSINOPHIL NFR BLD AUTO: 5.8 %
ERYTHROCYTE [DISTWIDTH] IN BLOOD BY AUTOMATED COUNT: 15.5 % (ref 11.5–14.5)
GLUCOSE SERPL-MCNC: 139 MG/DL (ref 65–99)
GLUCOSE UR STRIP.AUTO-MCNC: NORMAL MG/DL
HCT VFR BLD AUTO: 38.2 % (ref 41–52)
HGB BLD-MCNC: 12.8 G/DL (ref 13.5–17.5)
IMM GRANULOCYTES # BLD AUTO: 0.03 X10*3/UL (ref 0–0.7)
IMM GRANULOCYTES NFR BLD AUTO: 0.6 % (ref 0–0.9)
KETONES UR STRIP.AUTO-MCNC: NEGATIVE MG/DL
LEUKOCYTE ESTERASE UR QL STRIP.AUTO: NEGATIVE
LYMPHOCYTES # BLD AUTO: 0.69 X10*3/UL (ref 1.2–4.8)
LYMPHOCYTES NFR BLD AUTO: 14.3 %
MCH RBC QN AUTO: 29.8 PG (ref 26–34)
MCHC RBC AUTO-ENTMCNC: 33.5 G/DL (ref 32–36)
MCV RBC AUTO: 89 FL (ref 80–100)
MONOCYTES # BLD AUTO: 0.59 X10*3/UL (ref 0.1–1)
MONOCYTES NFR BLD AUTO: 12.2 %
MUCOUS THREADS #/AREA URNS AUTO: NORMAL /LPF
NEUTROPHILS # BLD AUTO: 3.2 X10*3/UL (ref 1.2–7.7)
NEUTROPHILS NFR BLD AUTO: 66.5 %
NITRITE UR QL STRIP.AUTO: NEGATIVE
NRBC BLD-RTO: 0 /100 WBCS (ref 0–0)
PH UR STRIP.AUTO: 6 [PH]
PLATELET # BLD AUTO: 240 X10*3/UL (ref 150–450)
POTASSIUM SERPL-SCNC: 3.7 MMOL/L (ref 3.4–5.1)
PROT UR STRIP.AUTO-MCNC: ABNORMAL MG/DL
RBC # BLD AUTO: 4.3 X10*6/UL (ref 4.5–5.9)
RBC # UR STRIP.AUTO: NEGATIVE /UL
RBC #/AREA URNS AUTO: NORMAL /HPF
SARS-COV-2 RNA RESP QL NAA+PROBE: NOT DETECTED
SODIUM SERPL-SCNC: 141 MMOL/L (ref 133–145)
SP GR UR STRIP.AUTO: 1.02
UROBILINOGEN UR STRIP.AUTO-MCNC: ABNORMAL MG/DL
WBC # BLD AUTO: 4.8 X10*3/UL (ref 4.4–11.3)
WBC #/AREA URNS AUTO: NORMAL /HPF

## 2024-05-10 PROCEDURE — 71045 X-RAY EXAM CHEST 1 VIEW: CPT

## 2024-05-10 PROCEDURE — 74176 CT ABD & PELVIS W/O CONTRAST: CPT

## 2024-05-10 PROCEDURE — 85025 COMPLETE CBC W/AUTO DIFF WBC: CPT | Performed by: EMERGENCY MEDICINE

## 2024-05-10 PROCEDURE — 81001 URINALYSIS AUTO W/SCOPE: CPT | Performed by: EMERGENCY MEDICINE

## 2024-05-10 PROCEDURE — 99284 EMERGENCY DEPT VISIT MOD MDM: CPT | Mod: 25

## 2024-05-10 PROCEDURE — 74176 CT ABD & PELVIS W/O CONTRAST: CPT | Performed by: RADIOLOGY

## 2024-05-10 PROCEDURE — 87635 SARS-COV-2 COVID-19 AMP PRB: CPT | Performed by: EMERGENCY MEDICINE

## 2024-05-10 PROCEDURE — 36415 COLL VENOUS BLD VENIPUNCTURE: CPT | Performed by: EMERGENCY MEDICINE

## 2024-05-10 PROCEDURE — 2500000004 HC RX 250 GENERAL PHARMACY W/ HCPCS (ALT 636 FOR OP/ED): Performed by: EMERGENCY MEDICINE

## 2024-05-10 PROCEDURE — 2500000001 HC RX 250 WO HCPCS SELF ADMINISTERED DRUGS (ALT 637 FOR MEDICARE OP): Performed by: EMERGENCY MEDICINE

## 2024-05-10 PROCEDURE — 71045 X-RAY EXAM CHEST 1 VIEW: CPT | Performed by: RADIOLOGY

## 2024-05-10 PROCEDURE — 80048 BASIC METABOLIC PNL TOTAL CA: CPT | Performed by: EMERGENCY MEDICINE

## 2024-05-10 RX ORDER — PREDNISONE 50 MG/1
50 TABLET ORAL DAILY
Qty: 5 TABLET | Refills: 0 | Status: SHIPPED | OUTPATIENT
Start: 2024-05-10 | End: 2024-05-15

## 2024-05-10 RX ORDER — BENZONATATE 100 MG/1
100 CAPSULE ORAL EVERY 8 HOURS
Qty: 21 CAPSULE | Refills: 0 | Status: SHIPPED | OUTPATIENT
Start: 2024-05-10 | End: 2024-05-10

## 2024-05-10 RX ORDER — ALBUTEROL SULFATE 90 UG/1
1-2 AEROSOL, METERED RESPIRATORY (INHALATION) EVERY 6 HOURS PRN
Qty: 18 G | Refills: 0 | Status: SHIPPED | OUTPATIENT
Start: 2024-05-10 | End: 2024-05-10

## 2024-05-10 RX ORDER — DOXYCYCLINE 100 MG/1
100 CAPSULE ORAL 2 TIMES DAILY
Qty: 20 CAPSULE | Refills: 0 | Status: SHIPPED | OUTPATIENT
Start: 2024-05-10 | End: 2024-05-10

## 2024-05-10 RX ORDER — BENZONATATE 100 MG/1
100 CAPSULE ORAL EVERY 8 HOURS
Qty: 21 CAPSULE | Refills: 0 | Status: SHIPPED | OUTPATIENT
Start: 2024-05-10 | End: 2024-05-24 | Stop reason: SDUPTHER

## 2024-05-10 RX ORDER — DOXYCYCLINE 100 MG/1
100 CAPSULE ORAL 2 TIMES DAILY
Qty: 20 CAPSULE | Refills: 0 | Status: SHIPPED | OUTPATIENT
Start: 2024-05-10 | End: 2024-05-20

## 2024-05-10 RX ORDER — PREDNISONE 50 MG/1
50 TABLET ORAL DAILY
Qty: 5 TABLET | Refills: 0 | Status: SHIPPED | OUTPATIENT
Start: 2024-05-10 | End: 2024-05-10

## 2024-05-10 RX ORDER — ALBUTEROL SULFATE 90 UG/1
1-2 AEROSOL, METERED RESPIRATORY (INHALATION) EVERY 6 HOURS PRN
Qty: 18 G | Refills: 0 | Status: SHIPPED | OUTPATIENT
Start: 2024-05-10 | End: 2024-06-09

## 2024-05-10 RX ORDER — PREDNISONE 20 MG/1
60 TABLET ORAL ONCE
Status: COMPLETED | OUTPATIENT
Start: 2024-05-10 | End: 2024-05-10

## 2024-05-10 RX ORDER — DOXYCYCLINE 100 MG/1
100 CAPSULE ORAL ONCE
Status: COMPLETED | OUTPATIENT
Start: 2024-05-10 | End: 2024-05-10

## 2024-05-10 RX ADMIN — PREDNISONE 60 MG: 20 TABLET ORAL at 23:16

## 2024-05-10 RX ADMIN — DOXYCYCLINE HYCLATE 100 MG: 100 CAPSULE ORAL at 23:16

## 2024-05-10 ASSESSMENT — COLUMBIA-SUICIDE SEVERITY RATING SCALE - C-SSRS
6. HAVE YOU EVER DONE ANYTHING, STARTED TO DO ANYTHING, OR PREPARED TO DO ANYTHING TO END YOUR LIFE?: NO
2. HAVE YOU ACTUALLY HAD ANY THOUGHTS OF KILLING YOURSELF?: NO
1. IN THE PAST MONTH, HAVE YOU WISHED YOU WERE DEAD OR WISHED YOU COULD GO TO SLEEP AND NOT WAKE UP?: NO

## 2024-05-10 ASSESSMENT — PAIN - FUNCTIONAL ASSESSMENT: PAIN_FUNCTIONAL_ASSESSMENT: 0-10

## 2024-05-10 ASSESSMENT — PAIN DESCRIPTION - PAIN TYPE: TYPE: ACUTE PAIN

## 2024-05-10 ASSESSMENT — PAIN SCALES - GENERAL: PAINLEVEL_OUTOF10: 0 - NO PAIN

## 2024-05-11 NOTE — DISCHARGE INSTRUCTIONS
Thank you for choosing [facility] Emergency Department. It was my pleasure to be involved in your care today.         As of today's visit, based on reasonable likelihood, that it is safe for you to be discharged back to your residence to follow-up as an outpatient for ongoing management of your medical problem. You should follow-up with any referrals / primary provider as soon as possible. The contacts (number, addresses) are listed below.         *Return to us immediately, if you feel you are getting worse, not getting better, or any new symptoms develop.         Make sure your pharmacy and primary doctor is aware of any new medications prescribed today.          It is your responsibility to contact as soon as possible, and follow through with, any referrals you were given today. We do recommend you inform them you are a [facility] follow-up patient, as often they can better accommodate your need to be seen, provided their schedules allow. We will, and have, made every effort to ensure you have access to adequate follow-up specialists available.          All problems may not be able to be fixed in one ER visit. This is why timely ongoing care is important, and this is a responsibility you share in. Further, you are free to follow up with any provider you choose, and this is not limited to our suggestion.          If cultures were obtained today, you will be contacted should anything result that would require further treatment. Please contact the ED at the number provided with questions.          Having trouble affording medications? Try [email or web address]! (This is not a hospital endorsed website, merely a recommendation based on my own personal experiences with [employer])

## 2024-05-11 NOTE — ED PROVIDER NOTES
HPI   Chief Complaint   Patient presents with    Flank Pain     Bilateral flank pain x 1 week, also accompanied with weakness,cough,  and headache. No urinary symptoms        HPI  64-year-old male presents complaint of cough and bilateral chest pain.  Patient states he had a cough and he feels like he has had some fevers on and off about the last week.  He has a headache when he coughs.  He has some bilateral chest pain on the sides when he coughs.  Not short of breath or dizzy or lightheaded.  No nausea or vomiting.  No abdominal pain.  No change in urine or bowel habits.  He is not taking medicine for symptoms.  He is a smoker.  He states he is supposed to have an inhaler but is out of this.  No other complaints.                  Jordy Coma Scale Score: 15                     Patient History   No past medical history on file.  Past Surgical History:   Procedure Laterality Date    CT ABDOMEN PELVIS ANGIOGRAM W AND/OR WO IV CONTRAST  7/28/2015    CT ABDOMEN PELVIS ANGIOGRAM W AND/OR WO IV CONTRAST [facility] ANCILLARY LEGACY    CT ABDOMEN PELVIS ANGIOGRAM W AND/OR WO IV CONTRAST  11/7/2017    CT ABDOMEN PELVIS ANGIOGRAM W AND/OR WO IV CONTRAST [facility] INPATIENT LEGACY    CT ABDOMEN PELVIS ANGIOGRAM W AND/OR WO IV CONTRAST  3/24/2022    CT ABDOMEN PELVIS ANGIOGRAM W AND/OR WO IV CONTRAST [facility]    CT AORTA AND BILATERAL ILIOFEMORAL RUNOFF ANGIOGRAM W AND/OR WO IV CONTRAST  4/4/2022    CT AORTA AND BILATERAL ILIOFEMORAL RUNOFF ANGIOGRAM W AND/OR WO IV CONTRAST 4/4/2022 [facility] [place] [place] ANCILLARY LEGACY    CT AORTA AND BILATERAL ILIOFEMORAL RUNOFF ANGIOGRAM W AND/OR WO IV CONTRAST  9/12/2013    CT AORTA AND BILATERAL ILIOFEMORAL RUNOFF ANGIOGRAM W AND/OR WO IV CONTRAST [facility]    CT AORTA AND BILATERAL ILIOFEMORAL RUNOFF ANGIOGRAM W AND/OR WO IV CONTRAST  10/29/2013    CT AORTA AND BILATERAL ILIOFEMORAL RUNOFF ANGIOGRAM W AND/OR WO IV CONTRAST [facility]    CT AORTA AND BILATERAL ILIOFEMORAL RUNOFF  ANGIOGRAM W AND/OR WO IV CONTRAST  10/5/2016    CT AORTA AND BILATERAL ILIOFEMORAL RUNOFF ANGIOGRAM W AND/OR WO IV CONTRAST [facility] ANCILLARY LEGACY    CT AORTA AND BILATERAL ILIOFEMORAL RUNOFF ANGIOGRAM W AND/OR WO IV CONTRAST  12/14/2016    CT AORTA AND BILATERAL ILIOFEMORAL RUNOFF ANGIOGRAM W AND/OR WO IV CONTRAST [facility] INPATIENT LEGACY    CT PELVIS ANGIO W AND WO IV CONTRAST  9/1/2018    CT PELVIS ANGIO W AND WO IV CONTRAST [facility] EMERGENCY LEGACY    IR ANGIOGRAM LOWER EXTREMITY BILATERAL Bilateral 11/5/2015    IR ANGIOGRAM LOWER EXTREMITY BILATERAL [facility]IENT [facility]GAC[facility]    IR ANGIOGRAM LOWER EXTREMITY LEFT Left 10/28/2015    IR ANGIOGRAM LOWER EXTREMITY LEFT [facility]    IR ANGIOGRAM LOWER EXTREMITY LEFT Left 11/10/2017    IR ANGIOGRAM LOWER EXTREMITY LEFT [facility] INPATIENT LEGACY    IR ANGIOGRAM LOWER EXTREMITY LEFT Left 5/3/2019    IR ANGIOGRAM LOWER EXTREMITY LEFT [facility] INPATIENT LEGACY    MR ANGIO ABDOMEN W AND WO IV CONTRAST  5/18/2022    MR ABDOMEN ANGIO W AND WO IV CONTRAST [facility] INPATIENT LEGACY    OTHER SURGICAL HISTORY  07/31/2017    Angioplasty    OTHER SURGICAL HISTORY  07/31/2017    Creation Of Pericardial Window    US GUIDED ABSCESS DRAIN  5/18/2022    US GUIDED ABSCESS DRAIN [facility] INPATIENT LEGACY     No family history on file.  Social History     Tobacco Use    Smoking status: Every Day     Types: Cigarettes    Smokeless tobacco: Not on file   Substance Use Topics    Alcohol use: Not on file    Drug use: Not on file       Physical Exam   ED Triage Vitals [05/10/24 2212]   Temperature Heart Rate Respirations BP   37.1 °C (98.7 °F) 76 18 115/81      Pulse Ox Temp Source Heart Rate Source Patient Position   96 % Oral Monitor Sitting      BP Location FiO2 (%)     Right arm --       Physical Exam  General:  Awake, alert, no acute distress.  Head: Normocephalic, Atraumatic  Neck: Supple, trachea midline, no stridor  Skin: Warm and dry, no rashes   Lungs: Clear to auscultation bilaterally no acute respiratory  distress, speaking in full sentences without difficulty  CV: Regular Rate Rhythm with no obvious murmurs gallops rubs noted, no jugular venous distention, no pedal edema   Abdomen: Soft, nontender, nondistended, positive bowel sounds, no peritoneal signs  Neuro:  No gross focal neurologic deficits, NIH is 0  Musculoskeletal:  Full range of motion in all 4 extremities  Psychiatric:  Alert oriented x 3, Good insight into condition.  ED Course & MDM   Diagnoses as of 05/10/24 2310   Atypical pneumonia       Medical Decision Making  Patient is a smoker, has had the symptoms for the last week.  His workup essentially unremarkable emerged part.  I have no suspicion for cardiac etiology does not appear to be in congestive heart failure.  I will treat him for an atypical pneumonia with doxycycline, prednisone, Proventil inhaler, Tessalon Perles.  Advise follow-up with his doctor.  He is stable for discharge.    Procedure  Procedures     Andrew Hartman,   05/10/24 4278

## 2024-05-22 PROBLEM — I65.29 STENOSIS OF CAROTID ARTERY: Status: ACTIVE | Noted: 2024-03-29

## 2024-05-22 PROBLEM — R94.31 ABNORMAL ELECTROCARDIOGRAPHY: Status: ACTIVE | Noted: 2024-04-01

## 2024-05-24 ENCOUNTER — OFFICE VISIT (OUTPATIENT)
Dept: CARDIOLOGY | Facility: CLINIC | Age: 64
End: 2024-05-24
Payer: MEDICARE

## 2024-05-24 VITALS
DIASTOLIC BLOOD PRESSURE: 80 MMHG | WEIGHT: 197 LBS | HEART RATE: 82 BPM | BODY MASS INDEX: 26.68 KG/M2 | OXYGEN SATURATION: 98 % | SYSTOLIC BLOOD PRESSURE: 118 MMHG | HEIGHT: 72 IN

## 2024-05-24 DIAGNOSIS — J18.9 ATYPICAL PNEUMONIA: Primary | ICD-10-CM

## 2024-05-24 DIAGNOSIS — R60.9 EDEMA, UNSPECIFIED TYPE: ICD-10-CM

## 2024-05-24 DIAGNOSIS — J18.9 ATYPICAL PNEUMONIA: ICD-10-CM

## 2024-05-24 DIAGNOSIS — R60.9 EDEMA, UNSPECIFIED TYPE: Primary | ICD-10-CM

## 2024-05-24 PROCEDURE — 99214 OFFICE O/P EST MOD 30 MIN: CPT | Performed by: INTERNAL MEDICINE

## 2024-05-24 PROCEDURE — 1159F MED LIST DOCD IN RCRD: CPT | Performed by: INTERNAL MEDICINE

## 2024-05-24 PROCEDURE — 3074F SYST BP LT 130 MM HG: CPT | Performed by: INTERNAL MEDICINE

## 2024-05-24 PROCEDURE — 3078F DIAST BP <80 MM HG: CPT | Performed by: INTERNAL MEDICINE

## 2024-05-24 RX ORDER — FUROSEMIDE 20 MG/1
20 TABLET ORAL DAILY
Qty: 90 TABLET | Refills: 1 | Status: SHIPPED | OUTPATIENT
Start: 2024-05-24 | End: 2024-05-24 | Stop reason: SDUPTHER

## 2024-05-24 RX ORDER — BENZONATATE 100 MG/1
100 CAPSULE ORAL 3 TIMES DAILY PRN
Qty: 42 CAPSULE | Refills: 0 | Status: SHIPPED | OUTPATIENT
Start: 2024-05-24 | End: 2024-06-07

## 2024-05-24 RX ORDER — FUROSEMIDE 20 MG/1
20 TABLET ORAL DAILY
Qty: 90 TABLET | Refills: 1 | Status: SHIPPED | OUTPATIENT
Start: 2024-05-24 | End: 2025-05-24

## 2024-05-24 RX ORDER — BENZONATATE 100 MG/1
100 CAPSULE ORAL 3 TIMES DAILY PRN
Qty: 42 CAPSULE | Refills: 0 | Status: SHIPPED | OUTPATIENT
Start: 2024-05-24 | End: 2024-05-24 | Stop reason: SDUPTHER

## 2024-05-24 NOTE — PATIENT INSTRUCTIONS
Start furosemide (lasix) 20 mg  tablet (1) tab once daily  Refilled tessalon  Labs done this year   Follow up in 8/2024

## 2024-05-24 NOTE — PROGRESS NOTES
Primary Care Physician: Lubna Lugo MD  Date of Visit: 05/24/2024  8:45 AM EDT  Location of visit: [facility] [address]     Chief Complaint:   Chief Complaint   Patient presents with    Follow-up     2 month fuv     HPI / Summary:   Dayo Quesada is a 64 y.o. male presents for follow of coronary artery disease, hypertension, hyperlipidemia, heart failure, cardiomyopathy and a routine medication evaluation    ROS    Medical History:   He has no past medical history on file.  Surgical Hx:   He has a past surgical history that includes Other surgical history (07/31/2017); Other surgical history (07/31/2017); CT angio aorta and bilateral iliofemoral runoff w and or wo IV contrast (4/4/2022); CT angio aorta and bilateral iliofemoral runoff w and or wo IV contrast (9/12/2013); CT angio aorta and bilateral iliofemoral runoff w and or wo IV contrast (10/29/2013); CT angio abdomen pelvis w and or wo IV IV contrast (7/28/2015); IR angiogram lower extremity bilateral (Bilateral, 11/5/2015); IR angiogram lower extremity left (Left, 10/28/2015); CT angio aorta and bilateral iliofemoral runoff w and or wo IV contrast (10/5/2016); CT angio aorta and bilateral iliofemoral runoff w and or wo IV contrast (12/14/2016); CT angio abdomen pelvis w and or wo IV IV contrast (11/7/2017); IR angiogram lower extremity left (Left, 11/10/2017); CT angio pelvis w and wo IV contrast (9/1/2018); IR angiogram lower extremity left (Left, 5/3/2019); CT angio abdomen pelvis w and or wo IV IV contrast (3/24/2022); US guided abscess drain (5/18/2022); and MR angio abdomen w and wo IV contrast (5/18/2022).   Social Hx:   He reports that he has been smoking cigarettes. He does not have any smokeless tobacco history on file. No history on file for alcohol use and drug use.  Family Hx:   His family history is not on file.   Allergies:  Allergies   Allergen Reactions    Clarithromycin Other, Swelling and Unknown     Outpatient Medications:  Current  Outpatient Medications   Medication Instructions    acetaminophen (TYLENOL) 325 mg, oral, Every 6 hours PRN    albuterol 90 mcg/actuation inhaler 1-2 puffs, inhalation, Every 6 hours PRN    atorvastatin (LIPITOR) 80 mg, oral, Daily    carvedilol (COREG) 25 mg, oral, 2 times daily (morning and late afternoon)    Entresto  mg tablet     FLUoxetine (PROzac) 20 mg tablet     haloperidol decanoate (Haldol Decanoate) 100 mg/mL injection     methocarbamol (ROBAXIN) 500 mg, oral, 3 times daily PRN    OLANZapine (ZyPREXA) 15 mg tablet     polyethylene glycol (GLYCOLAX, MIRALAX) 17 g, oral, Once    sacubitriL-valsartan (Entresto) 49-51 mg tablet 1 tablet, oral, Every 12 hours    traMADol (ULTRAM) 50 mg, oral, Every 6 hours PRN    Xarelto 20 mg, oral, Daily with evening meal     Physical Exam:  Vitals:    05/24/24 0843   BP: 116/78   BP Location: Left arm   Patient Position: Sitting   BP Cuff Size: Large adult   Pulse: 64   SpO2: 98%   Weight: 89.4 kg (197 lb)   Height: 1.829 m (6')     Wt Readings from Last 5 Encounters:   05/24/24 89.4 kg (197 lb)   05/10/24 90.5 kg (199 lb 8 oz)   04/01/24 93.9 kg (207 lb)   01/03/24 89.4 kg (197 lb)   06/21/23 94.8 kg (209 lb)     Physical Exam  JVP not elevated. Carotid impulses are 2+ without overlying bruit.   Chest exhibits fair to good air movement with completely clear breath sounds.   The cardiac rhythm is regular with no premature beats.   Normal S1 and S2. No gallop, murmur or rub, or click.   Abdomen is soft and benign without focal tenderness.   With no lower leg edema. The pedal pulses are intact.     Last Labs:  Admission on 05/10/2024, Discharged on 05/10/2024   Component Date Value    WBC 05/10/2024 4.8     nRBC 05/10/2024 0.0     RBC 05/10/2024 4.30 (L)     Hemoglobin 05/10/2024 12.8 (L)     Hematocrit 05/10/2024 38.2 (L)     MCV 05/10/2024 89     MCH 05/10/2024 29.8     MCHC 05/10/2024 33.5     RDW 05/10/2024 15.5 (H)     Platelets 05/10/2024 240     Neutrophils %  05/10/2024 66.5     Immature Granulocytes %,* 05/10/2024 0.6     Lymphocytes % 05/10/2024 14.3     Monocytes % 05/10/2024 12.2     Eosinophils % 05/10/2024 5.8     Basophils % 05/10/2024 0.6     Neutrophils Absolute 05/10/2024 3.20     Immature Granulocytes Ab* 05/10/2024 0.03     Lymphocytes Absolute 05/10/2024 0.69 (L)     Monocytes Absolute 05/10/2024 0.59     Eosinophils Absolute 05/10/2024 0.28     Basophils Absolute 05/10/2024 0.03     Glucose 05/10/2024 139 (H)     Sodium 05/10/2024 141     Potassium 05/10/2024 3.7     Chloride 05/10/2024 106     Bicarbonate 05/10/2024 25     Urea Nitrogen 05/10/2024 24     Creatinine 05/10/2024 1.40     eGFR 05/10/2024 55 (L)     Calcium 05/10/2024 9.0     Anion Gap 05/10/2024 10     Color, Urine 05/10/2024 Yellow     Appearance, Urine 05/10/2024 Clear     Specific Gravity, Urine 05/10/2024 1.024     pH, Urine 05/10/2024 6.0     Protein, Urine 05/10/2024 10 (TRACE)     Glucose, Urine 05/10/2024 Normal     Blood, Urine 05/10/2024 NEGATIVE     Ketones, Urine 05/10/2024 NEGATIVE     Bilirubin, Urine 05/10/2024 NEGATIVE     Urobilinogen, Urine 05/10/2024 2 (1+) (A)     Nitrite, Urine 05/10/2024 NEGATIVE     Leukocyte Esterase, Urine 05/10/2024 NEGATIVE     Coronavirus 2019, PCR 05/10/2024 Not Detected     WBC, Urine 05/10/2024 1-5     RBC, Urine 05/10/2024 1-2     Mucus, Urine 05/10/2024 FEW         Assessment/Plan   Assessment:     1. CAD. [facility] Patient did have a screening outpatient IV pharmacological nuclear stress test performed on 12/21/2021 which demonstrated a moderate to large defect that was fixed consistent with scar involving the apex and inferior wall with nontransmural fixed septal defect again consistent with scarring. The lateral wall appeared to be normal and there was no stress-induced reversible myocardial ischemia. The patient denies any anginal type chest discomfort. Prior to the stress test the  patient also had an echocardiogram performed on 10/25/2021 and demonstrating moderate hypokinesis of the mid and distal anteroseptal wall with an LV ejection fraction of 45%. He was found to have a bicuspid aortic valve with a mild degree of stenosis peak systolic pressure gradient of 28 mmHg. There was mild dilatation of the aortic root measuring 3.9 cm. 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     2. Peripheral vascular disease with left femoropopliteal PTFE bypass/stenting of left superficial femoral artery. 11/2017 at [facility] with Dr Arellano. CT angio 09/2018 showed a well patent aortobifemoral bypass graft extending from the infrarenal abdominal aorta down to proximal superficial femoral arteries bilaterally. 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 On asa 81 mg daily. Will continue Xarelto 20 mg daily.      3. Hyperlipidemia. Last FLP done 07/2018 showed chol 146, HDL 26, LDL 76, trig 220. Continue atorvastatin 80 mg daily. Had labwork 08/2020. FLP done 03/2021 showed chol 145, HDL 30, LDL 82, trig 164. The have patient will have lab work repeated including a lipid panel CBC CMP and glycohemoglobin.     4. Ischemic cardiomyopathy/Bicuspid AV. Last echo showed LV mildly dilated, EF 50--55%, mild AV stenosis. Continue carvedilol and entresto as well as furosemide. Echo done today 10/25/2021 showed EF 45%, mild AI, with gradient 28 mm Hg, moderate hypokinesis of the mid and distal anteroseptal wall, mild diastolic dysfunction, possible bicuspid AV . Plan to repeat echo every two years. The patient's dose of Entresto will be increased from 49/51 mg twice daily to 97/103 mg twice daily. Continue carvedilol 12.5 mg twice daily unchanged along with the Lasix 20 mg daily. Echo done 09/2023  showed EF 55%, mild hypokinesis of the mid to distal half of the anteroseptal wall, bicuspid AV, moderate AS with 53 mm Hg gradient, 4.0 cm aortic dilation, evidence of diastolic dysfunction. Will likely need a new valve in the next 5 years. Will monitor echoes every 15-18 months.  The patient was seen at the [facility] emergency room on 5/10/2024 with cough bilateral chest pain intermittent fever and headache.  Chest x-ray showed some mild interstitial edema trace pleural effusions.  Abdominal CT demonstrated trace bilateral pleural effusions mild interstitial edema cortical scarring of the kidneys bilaterally pancreatic cyst 4.2 x 2.2 cm in diameter.  CBC included hematocrit 38.2.  SMA panel creatinine 1.40 glucose 139.  Nasal swab was negative.  Patient did have some increased nasal drainage.  The patient's echocardiogram 9/11/2023 showed an LV ejection fraction of 50-55% moderate LVH moderate hypokinesis of basal and mid anteroseptal wall moderate to severe aortic valve stenosis peak systolic pressure gradient of 53 mmHg.  Patient has some mild lingering shortness of breath and will begin low-dose Lasix 20 mg daily.  He will return to office in 8/2024 and repeat echo in 11/2024.     5. Schizophrenia. On meds.     6. Hypertension. Well controlled today.     7. Smoking. 1-2 ppd. Cessation encouraged.     8. Hx of covid-19 05/2021 and both vaccines.     9.? Left inguinal hernia. Patient developed pain in a slight area of swelling in the left inguinal region after throwing some material into a dumpster. He will be referred to general surgery to assess for possible hernia.     10. AAA repair with graft infection. Patient had aortic endograft s/p exploration, aortic reconstruction and left popliteal obturator bypass with right profunda bypass with SFA graft 04/2022. He was seen for antibiotic treatment, but left AMA. Apparently did see ID for antibiotic treatment as outpatient.    11.  Aortic valve stenosis.   The patient's echocardiogram on 9/11/2023 in part demonstrated moderately severe aortic valve stenosis with a peak systolic pressure gradient of 53 mmHg.  He does have history of ischemic heart disease with some regional wall motion abnormalities with the echo also demonstrating moderate LVH moderate hypokinesis of basal mid anteroseptal wall and an LV ejection fraction of 50-55%.  Patient will have repeat echocardiogram performed in 11/2024.        Orders:  No orders of the defined types were placed in this encounter.     Followup Appts:  Future Appointments   Date Time Provider Department Center   7/3/2024  1:00 PM MENT [facility] ROOM [id number] [facility]   7/3/2024  2:00 PM MENT [facility] ROOM [id number] [facility]   7/3/2024  3:00 PM SUZI Lindo [id number] [facility]   10/7/2024  2:30 PM MENT ECHO/STRESS [id number] [facility]    10/7/2024  3:30 PM SUZI Lopez [id number] [facility]           ____________________________________________________________  Blank Barajas RN  [facility]  [facility]    Follow

## 2024-07-03 ENCOUNTER — HOSPITAL ENCOUNTER (OUTPATIENT)
Dept: VASCULAR MEDICINE | Facility: CLINIC | Age: 64
Discharge: HOME | End: 2024-07-03
Payer: MEDICARE

## 2024-07-03 ENCOUNTER — APPOINTMENT (OUTPATIENT)
Dept: VASCULAR SURGERY | Facility: CLINIC | Age: 64
End: 2024-07-03
Payer: MEDICARE

## 2024-07-03 DIAGNOSIS — I73.9 PAD (PERIPHERAL ARTERY DISEASE) (CMS-HCC): ICD-10-CM

## 2024-07-03 DIAGNOSIS — Z95.828 HX OF AORTO-FEMORAL BYPASS: ICD-10-CM

## 2024-07-03 PROCEDURE — 93978 VASCULAR STUDY: CPT

## 2024-07-03 PROCEDURE — 93922 UPR/L XTREMITY ART 2 LEVELS: CPT

## 2024-07-03 PROCEDURE — 93922 UPR/L XTREMITY ART 2 LEVELS: CPT | Performed by: SURGERY

## 2024-07-03 PROCEDURE — 93978 VASCULAR STUDY: CPT | Performed by: SURGERY

## 2024-07-17 ENCOUNTER — TELEPHONE (OUTPATIENT)
Dept: CARDIOLOGY | Facility: CLINIC | Age: 64
End: 2024-07-17
Payer: MEDICARE

## 2024-08-07 ENCOUNTER — OFFICE VISIT (OUTPATIENT)
Dept: VASCULAR SURGERY | Facility: CLINIC | Age: 64
End: 2024-08-07
Payer: MEDICARE

## 2024-08-07 VITALS
HEART RATE: 65 BPM | WEIGHT: 185 LBS | DIASTOLIC BLOOD PRESSURE: 88 MMHG | OXYGEN SATURATION: 96 % | BODY MASS INDEX: 25.06 KG/M2 | SYSTOLIC BLOOD PRESSURE: 133 MMHG | HEIGHT: 72 IN

## 2024-08-07 DIAGNOSIS — N52.9 IMPOTENCE: ICD-10-CM

## 2024-08-07 DIAGNOSIS — Z87.448 HISTORY OF HEMATURIA: ICD-10-CM

## 2024-08-07 DIAGNOSIS — F52.32 ANORGASMIA OF MALE: ICD-10-CM

## 2024-08-07 DIAGNOSIS — I73.9 PAD (PERIPHERAL ARTERY DISEASE) (CMS-HCC): ICD-10-CM

## 2024-08-07 DIAGNOSIS — Z95.828 HX OF AORTO-FEMORAL BYPASS: Primary | ICD-10-CM

## 2024-08-07 PROCEDURE — 1126F AMNT PAIN NOTED NONE PRSNT: CPT | Performed by: NURSE PRACTITIONER

## 2024-08-07 PROCEDURE — 3074F SYST BP LT 130 MM HG: CPT | Performed by: NURSE PRACTITIONER

## 2024-08-07 PROCEDURE — 3079F DIAST BP 80-89 MM HG: CPT | Performed by: NURSE PRACTITIONER

## 2024-08-07 PROCEDURE — 3008F BODY MASS INDEX DOCD: CPT | Performed by: NURSE PRACTITIONER

## 2024-08-07 PROCEDURE — 99213 OFFICE O/P EST LOW 20 MIN: CPT | Performed by: NURSE PRACTITIONER

## 2024-08-07 PROCEDURE — 1159F MED LIST DOCD IN RCRD: CPT | Performed by: NURSE PRACTITIONER

## 2024-08-07 ASSESSMENT — LIFESTYLE VARIABLES
HOW OFTEN DO YOU HAVE SIX OR MORE DRINKS ON ONE OCCASION: NEVER
HOW OFTEN DO YOU HAVE A DRINK CONTAINING ALCOHOL: NEVER
SKIP TO QUESTIONS 9-10: 1
AUDIT-C TOTAL SCORE: 0
HOW MANY STANDARD DRINKS CONTAINING ALCOHOL DO YOU HAVE ON A TYPICAL DAY: PATIENT DOES NOT DRINK

## 2024-08-07 ASSESSMENT — ENCOUNTER SYMPTOMS
OCCASIONAL FEELINGS OF UNSTEADINESS: 0
LOSS OF SENSATION IN FEET: 0
DEPRESSION: 0

## 2024-08-07 ASSESSMENT — COLUMBIA-SUICIDE SEVERITY RATING SCALE - C-SSRS
2. HAVE YOU ACTUALLY HAD ANY THOUGHTS OF KILLING YOURSELF?: NO
6. HAVE YOU EVER DONE ANYTHING, STARTED TO DO ANYTHING, OR PREPARED TO DO ANYTHING TO END YOUR LIFE?: NO
1. IN THE PAST MONTH, HAVE YOU WISHED YOU WERE DEAD OR WISHED YOU COULD GO TO SLEEP AND NOT WAKE UP?: NO

## 2024-08-07 ASSESSMENT — PATIENT HEALTH QUESTIONNAIRE - PHQ9
2. FEELING DOWN, DEPRESSED OR HOPELESS: NOT AT ALL
SUM OF ALL RESPONSES TO PHQ9 QUESTIONS 1 AND 2: 0
1. LITTLE INTEREST OR PLEASURE IN DOING THINGS: NOT AT ALL

## 2024-08-07 ASSESSMENT — PAIN SCALES - GENERAL: PAINLEVEL: 0-NO PAIN

## 2024-08-07 NOTE — PATIENT INSTRUCTIONS
Noam,    It was great to see you again!  Thank you for choosing  vascular surgery for your care.    We reviewed the results of your testing from July 3, 2024.  Your bypasses on both sides are open and your circulation appears very good.  I can feel pulses on both feet.  Please keep an eye out for any signs or symptoms of open wounds that are not healing, pain in your legs with walking.  Should you have the symptoms please call me ASAP.  Please keep taking your aspirin and Xarelto.  Please try to stop smoking.  Please follow-up in 1 year with a repeat ultrasound and JOEL    Also, we discussed your 1 episode of blood in the urine.  You contacted Dr. Jones who advised you to stop the blood thinners but he resumed several days after.  You have not had any further bleeding in the urine.  I am referring you to urology.  He also discussed your issue with having an erection, they can help with this as well.

## 2024-08-07 NOTE — PROGRESS NOTES
History Of Present Illness  Dayo Quesada is a 64 y.o. male presenting with a complicated vascular history including aortic reconstruction with cryo graft secondary to infected aortobifemoral bypass.  Underwent aortic left SFA and aortic right profunda bypass.  Patient on chronic anticoagulation.  He does report 1 episode of hematuria that resolved quickly.  He also complains of inability to achieve or maintain an erection.  He also complains of inability to have an orgasm.  He believes these problems started after his surgery.  Denies any claudication or ischemic rest pain.  No open wounds or sores.     Past Medical History  He has no past medical history on file.    Surgical History  He has a past surgical history that includes Other surgical history (07/31/2017); Other surgical history (07/31/2017); CT angio aorta and bilateral iliofemoral runoff w and or wo IV contrast (4/4/2022); CT angio aorta and bilateral iliofemoral runoff w and or wo IV contrast (9/12/2013); CT angio aorta and bilateral iliofemoral runoff w and or wo IV contrast (10/29/2013); CT angio abdomen pelvis w and or wo IV IV contrast (7/28/2015); IR angiogram lower extremity bilateral (Bilateral, 11/5/2015); IR angiogram lower extremity left (Left, 10/28/2015); CT angio aorta and bilateral iliofemoral runoff w and or wo IV contrast (10/5/2016); CT angio aorta and bilateral iliofemoral runoff w and or wo IV contrast (12/14/2016); CT angio abdomen pelvis w and or wo IV IV contrast (11/7/2017); IR angiogram lower extremity left (Left, 11/10/2017); CT angio pelvis w and wo IV contrast (9/1/2018); IR angiogram lower extremity left (Left, 5/3/2019); CT angio abdomen pelvis w and or wo IV IV contrast (3/24/2022); US guided abscess drain (5/18/2022); and MR angio abdomen w and wo IV contrast (5/18/2022).     Social History  He reports that he has been smoking cigarettes. He does not have any smokeless tobacco history on file. No history on file for  alcohol use and drug use.    Family History  No family history on file.     Allergies  Clarithromycin    Review of Systems    CONSTITUTIONAL: Denies weight loss, fever and chills.     HEENT: Denies changes in vision and hearing.     RESPIRATORY: Denies SOB and cough.     CV: Denies palpitations and CP.     GI: Denies abdominal pain, nausea, vomiting and diarrhea.     : Denies dysuria and urinary frequency.  Positive for 1 episode of hematuria.  Positive for impotence and anorgasmia     MSK: Denies myalgia and joint pain.     SKIN: Denies rash and pruritus.     VASC: Denies claudication, ischemic rest pain, or open wounds or sores     NEUROLOGICAL: Denies headache and syncope.     PSYCHIATRIC: Denies recent changes in mood. Denies anxiety and depression.        Physical Exam    General: Pt is alert and oriented x 3. Pleasant, conversive  HEENT: Head is atraumatic, normocephalic.   Cardiac: Normal S1-S2.  Regular rate and rhythm.  No murmurs.  Respiratory: Lungs clear to auscultation.  No adventitious sounds.  Abdomen: Soft, nondistended, nontender.  Bowel sounds x4 quadrants.  Pulse exam: Palpable brachial and radial pulses bilaterall.    Extremities:   Neuro: Moves all extremities spontaneously.  No focal deficits.  Psych: Appropriate affect.  Answers questions appropriately.     Last Recorded Vitals  /88 (BP Location: Right arm)   Pulse 65   Wt 83.9 kg (185 lb)   SpO2 96%     Relevant Results    Scheduled medications  Current Outpatient Medications   Medication Instructions    albuterol 90 mcg/actuation inhaler 1-2 puffs, inhalation, Every 6 hours PRN    atorvastatin (LIPITOR) 80 mg, oral, Daily    carvedilol (COREG) 25 mg, oral, 2 times daily (morning and late afternoon)    Entresto  mg tablet     FLUoxetine (PROzac) 20 mg tablet     furosemide (LASIX) 20 mg, oral, Daily    haloperidol decanoate (Haldol Decanoate) 100 mg/mL injection     methocarbamol (ROBAXIN) 500 mg, oral, 3 times daily PRN     OLANZapine (ZyPREXA) 15 mg tablet     polyethylene glycol (GLYCOLAX, MIRALAX) 17 g, oral, Once    sacubitriL-valsartan (Entresto) 49-51 mg tablet 1 tablet, oral, Every 12 hours    traMADol (ULTRAM) 50 mg, oral, Every 6 hours PRN    Xarelto 20 mg, oral, Daily with evening meal           Assessment/Plan   Peripheral arterial disease  Status post aortic reconstruction with cryo graft, aorto left SFA and aorto right profunda bypass  Hematuria  Impotence  Anorgasmia    I reviewed the patient's aortoiliac duplex which noted patent aorto right profunda and aorto left superficial femoral bypasses.  ABIs were 1.14 on right and 1.07 on the left.  Patient with palpable pulses.  Clinically, he is doing well from a vascular standpoint with no complaints of claudication or ischemic rest pain.    Hematuria-given this was 1 isolated episode, I recommended patient continue his anticoagulation with Xarelto.  Referral to urology.    Impotence-patient states this has been since open abdominal vascular surgery.  Will refer to urology for further evaluation.    Anorgasmia-unclear etiology.  Again recommend urology referral.         Jose Farmer, JESSA-CNP

## 2024-08-11 ENCOUNTER — APPOINTMENT (OUTPATIENT)
Dept: RADIOLOGY | Facility: HOSPITAL | Age: 64
End: 2024-08-11
Payer: MEDICARE

## 2024-08-11 ENCOUNTER — HOSPITAL ENCOUNTER (EMERGENCY)
Facility: HOSPITAL | Age: 64
Discharge: HOME | End: 2024-08-11
Payer: MEDICARE

## 2024-08-11 VITALS
OXYGEN SATURATION: 100 % | BODY MASS INDEX: 24.92 KG/M2 | SYSTOLIC BLOOD PRESSURE: 116 MMHG | DIASTOLIC BLOOD PRESSURE: 89 MMHG | RESPIRATION RATE: 16 BRPM | HEIGHT: 72 IN | TEMPERATURE: 97.3 F | HEART RATE: 82 BPM | WEIGHT: 184 LBS

## 2024-08-11 DIAGNOSIS — N20.0 KIDNEY STONE: ICD-10-CM

## 2024-08-11 DIAGNOSIS — R31.9 HEMATURIA, UNSPECIFIED TYPE: Primary | ICD-10-CM

## 2024-08-11 LAB
ALBUMIN SERPL-MCNC: 3.7 G/DL (ref 3.5–5)
ALP BLD-CCNC: 150 U/L (ref 35–125)
ALT SERPL-CCNC: 13 U/L (ref 5–40)
ANION GAP SERPL CALC-SCNC: 11 MMOL/L
APPEARANCE UR: ABNORMAL
AST SERPL-CCNC: 18 U/L (ref 5–40)
BASOPHILS # BLD AUTO: 0.04 X10*3/UL (ref 0–0.1)
BASOPHILS NFR BLD AUTO: 0.6 %
BILIRUB SERPL-MCNC: 0.3 MG/DL (ref 0.1–1.2)
BILIRUB UR STRIP.AUTO-MCNC: NEGATIVE MG/DL
BUN SERPL-MCNC: 16 MG/DL (ref 8–25)
CALCIUM SERPL-MCNC: 9.2 MG/DL (ref 8.5–10.4)
CHLORIDE SERPL-SCNC: 110 MMOL/L (ref 97–107)
CO2 SERPL-SCNC: 24 MMOL/L (ref 24–31)
COLOR UR: ABNORMAL
CREAT SERPL-MCNC: 1.3 MG/DL (ref 0.4–1.6)
EGFRCR SERPLBLD CKD-EPI 2021: 61 ML/MIN/1.73M*2
EOSINOPHIL # BLD AUTO: 0.2 X10*3/UL (ref 0–0.7)
EOSINOPHIL NFR BLD AUTO: 2.8 %
ERYTHROCYTE [DISTWIDTH] IN BLOOD BY AUTOMATED COUNT: 14.5 % (ref 11.5–14.5)
GLUCOSE SERPL-MCNC: 119 MG/DL (ref 65–99)
GLUCOSE UR STRIP.AUTO-MCNC: NORMAL MG/DL
HCT VFR BLD AUTO: 42.3 % (ref 41–52)
HGB BLD-MCNC: 14.4 G/DL (ref 13.5–17.5)
IMM GRANULOCYTES # BLD AUTO: 0.03 X10*3/UL (ref 0–0.7)
IMM GRANULOCYTES NFR BLD AUTO: 0.4 % (ref 0–0.9)
KETONES UR STRIP.AUTO-MCNC: NEGATIVE MG/DL
LEUKOCYTE ESTERASE UR QL STRIP.AUTO: ABNORMAL
LYMPHOCYTES # BLD AUTO: 0.8 X10*3/UL (ref 1.2–4.8)
LYMPHOCYTES NFR BLD AUTO: 11.3 %
MCH RBC QN AUTO: 30.6 PG (ref 26–34)
MCHC RBC AUTO-ENTMCNC: 34 G/DL (ref 32–36)
MCV RBC AUTO: 90 FL (ref 80–100)
MONOCYTES # BLD AUTO: 0.57 X10*3/UL (ref 0.1–1)
MONOCYTES NFR BLD AUTO: 8 %
NEUTROPHILS # BLD AUTO: 5.45 X10*3/UL (ref 1.2–7.7)
NEUTROPHILS NFR BLD AUTO: 76.9 %
NITRITE UR QL STRIP.AUTO: NEGATIVE
NRBC BLD-RTO: 0 /100 WBCS (ref 0–0)
PH UR STRIP.AUTO: 5.5 [PH]
PLATELET # BLD AUTO: 284 X10*3/UL (ref 150–450)
POTASSIUM SERPL-SCNC: 3.9 MMOL/L (ref 3.4–5.1)
PROT SERPL-MCNC: 6.3 G/DL (ref 5.9–7.9)
PROT UR STRIP.AUTO-MCNC: ABNORMAL MG/DL
RBC # BLD AUTO: 4.7 X10*6/UL (ref 4.5–5.9)
RBC # UR STRIP.AUTO: ABNORMAL /UL
RBC #/AREA URNS AUTO: >20 /HPF
SODIUM SERPL-SCNC: 145 MMOL/L (ref 133–145)
SP GR UR STRIP.AUTO: 1.02
UROBILINOGEN UR STRIP.AUTO-MCNC: NORMAL MG/DL
WBC # BLD AUTO: 7.1 X10*3/UL (ref 4.4–11.3)
WBC #/AREA URNS AUTO: ABNORMAL /HPF
YEAST BUDDING #/AREA UR COMP ASSIST: PRESENT /HPF

## 2024-08-11 PROCEDURE — 2550000001 HC RX 255 CONTRASTS: Performed by: PHYSICIAN ASSISTANT

## 2024-08-11 PROCEDURE — 36415 COLL VENOUS BLD VENIPUNCTURE: CPT | Performed by: PHYSICIAN ASSISTANT

## 2024-08-11 PROCEDURE — 74177 CT ABD & PELVIS W/CONTRAST: CPT | Performed by: RADIOLOGY

## 2024-08-11 PROCEDURE — 85025 COMPLETE CBC W/AUTO DIFF WBC: CPT | Performed by: PHYSICIAN ASSISTANT

## 2024-08-11 PROCEDURE — 74177 CT ABD & PELVIS W/CONTRAST: CPT

## 2024-08-11 PROCEDURE — 80053 COMPREHEN METABOLIC PANEL: CPT | Performed by: PHYSICIAN ASSISTANT

## 2024-08-11 PROCEDURE — 87086 URINE CULTURE/COLONY COUNT: CPT | Mod: WESLAB | Performed by: PHYSICIAN ASSISTANT

## 2024-08-11 PROCEDURE — 99284 EMERGENCY DEPT VISIT MOD MDM: CPT

## 2024-08-11 PROCEDURE — 81001 URINALYSIS AUTO W/SCOPE: CPT | Performed by: PHYSICIAN ASSISTANT

## 2024-08-11 ASSESSMENT — PAIN SCALES - GENERAL: PAINLEVEL_OUTOF10: 0 - NO PAIN

## 2024-08-11 ASSESSMENT — PAIN - FUNCTIONAL ASSESSMENT: PAIN_FUNCTIONAL_ASSESSMENT: 0-10

## 2024-08-11 NOTE — ED PROVIDER NOTES
HPI   Chief Complaint   Patient presents with    Blood in Urine     Pt started Xarelto approx. 1 week ago, pt started noticing blood in his urine Saturday morning. No pain with urination.       64-year-old male presented emergency department for evaluation of hematuria.  He is not having any significant pain.  He is able to urinate without difficulty.  He recently started on Xarelto for a deep vein thrombosis.  He is well-appearing nontoxic.  Had several episodes with blood in the urine was concerned so presented to the emergency department.              Patient History   No past medical history on file.  Past Surgical History:   Procedure Laterality Date    CT ABDOMEN PELVIS ANGIOGRAM W AND/OR WO IV CONTRAST  7/28/2015    CT ABDOMEN PELVIS ANGIOGRAM W AND/OR WO IV CONTRAST [facility]    CT ABDOMEN PELVIS ANGIOGRAM W AND/OR WO IV CONTRAST  11/7/2017    CT ABDOMEN PELVIS ANGIOGRAM W AND/OR WO IV CONTRAST [facility]    CT ABDOMEN PELVIS ANGIOGRAM W AND/OR WO IV CONTRAST  3/24/2022    CT ABDOMEN PELVIS ANGIOGRAM W AND/OR WO IV CONTRAST [facility]    CT AORTA AND BILATERAL ILIOFEMORAL RUNOFF ANGIOGRAM W AND/OR WO IV CONTRAST  4/4/2022    CT AORTA AND BILATERAL ILIOFEMORAL RUNOFF ANGIOGRAM W AND/OR WO IV CONTRAST 4/4/2022 [facility]    CT AORTA AND BILATERAL ILIOFEMORAL RUNOFF ANGIOGRAM W AND/OR WO IV CONTRAST  9/12/2013    CT AORTA AND BILATERAL ILIOFEMORAL RUNOFF ANGIOGRAM W AND/OR WO IV CONTRAST [facility]    CT AORTA AND BILATERAL ILIOFEMORAL RUNOFF ANGIOGRAM W AND/OR WO IV CONTRAST  10/29/2013    CT AORTA AND BILATERAL ILIOFEMORAL RUNOFF ANGIOGRAM W AND/OR WO IV CONTRAST [facility]    CT AORTA AND BILATERAL ILIOFEMORAL RUNOFF ANGIOGRAM W AND/OR WO IV CONTRAST  10/5/2016    CT AORTA AND BILATERAL ILIOFEMORAL RUNOFF ANGIOGRAM W AND/OR WO IV CONTRAST [facility]    CT AORTA AND BILATERAL ILIOFEMORAL RUNOFF ANGIOGRAM W AND/OR WO IV CONTRAST  12/14/2016     CT AORTA AND BILATERAL ILIOFEMORAL RUNOFF ANGIOGRAM W AND/OR WO IV CONTRAST [facility] INPATIENT LEGACY    CT PELVIS ANGIO W AND WO IV CONTRAST  9/1/2018    CT PELVIS ANGIO W AND WO IV CONTRAST [facility] EMERGENCY LEGACY    IR ANGIOGRAM LOWER EXTREMITY BILATERAL Bilateral 11/5/2015    IR ANGIOGRAM LOWER EXTREMITY BILATERAL [facility] INPATIENT LEGACY    IR ANGIOGRAM LOWER EXTREMITY LEFT Left 10/28/2015    IR ANGIOGRAM LOWER EXTREMITY LEFT [facility]    IR ANGIOGRAM LOWER EXTREMITY LEFT Left 11/10/2017    IR ANGIOGRAM LOWER EXTREMITY LEFT [facility] INPATIENT LEGACY    IR ANGIOGRAM LOWER EXTREMITY LEFT Left 5/3/2019    IR ANGIOGRAM LOWER EXTREMITY LEFT [facility] INPATIENT LEGACY    MR ANGIO ABDOMEN W AND WO IV CONTRAST  5/18/2022    MR ABDOMEN ANGIO W AND WO IV CONTRAST [facility] INPATIENT LEGACY    OTHER SURGICAL HISTORY  07/31/2017    Angioplasty    OTHER SURGICAL HISTORY  07/31/2017    Creation Of Pericardial Window    US GUIDED ABSCESS DRAIN  5/18/2022    US GUIDED ABSCESS DRAIN [facility] INPATIENT LEGACY     No family history on file.  Social History     Tobacco Use    Smoking status: Every Day     Types: Cigarettes    Smokeless tobacco: Not on file   Substance Use Topics    Alcohol use: Not on file    Drug use: Not on file       Physical Exam   ED Triage Vitals [08/11/24 1542]   Temperature Heart Rate Respirations BP   36.3 °C (97.3 °F) 79 18 (!) 112/99      Pulse Ox Temp src Heart Rate Source Patient Position   100 % -- -- Sitting      BP Location FiO2 (%)     Left arm --       Physical Exam  Vitals and nursing note reviewed.   Constitutional:       Appearance: Normal appearance.   HENT:      Head: Normocephalic.      Nose: Nose normal.      Mouth/Throat:      Mouth: Mucous membranes are moist.   Cardiovascular:      Rate and Rhythm: Normal rate.      Pulses: Normal pulses.   Abdominal:      General: Abdomen is flat.   Musculoskeletal:         General: Normal range of motion.      Cervical back: Normal range of motion.   Skin:     General:  Skin is warm.   Neurological:      General: No focal deficit present.      Mental Status: He is alert and oriented to person, place, and time.   Psychiatric:         Mood and Affect: Mood normal.           ED Course & MDM   Diagnoses as of 08/11/24 1918   Hematuria, unspecified type   Kidney stone                 No data recorded     Jordy Coma Scale Score: 15 (08/11/24 1540 : Yfn Clifton, EMT)                           Medical Decision Making  I have seen and evaluated this patient.  Physician available for consultation.  Vital signs have been reviewed.  All laboratory and diagnostic imaging is reviewed by myself and interpreted by myself unless otherwise stated.  Additionally imaging is interpreted by radiologist.    CBC without significant leukocytosis or anemia, metabolic panel without significant renal impairment or electrolyte abnormality.  CT scan demonstrates ureteral stone.  Likely the source of patient's hematuria.  He is not having significant discomfort.  Patient will be given urology referral.  He is hemodynamically stable.  Released from emergency standpoint.    Labs Reviewed  CBC WITH AUTO DIFFERENTIAL - Abnormal     WBC                           7.1                    nRBC                          0.0                    RBC                           4.70                   Hemoglobin                    14.4                   Hematocrit                    42.3                   MCV                           90                     MCH                           30.6                   MCHC                          34.0                   RDW                           14.5                   Platelets                     284                    Neutrophils %                 76.9                   Immature Granulocytes %, Automated   0.4                    Lymphocytes %                 11.3                   Monocytes %                   8.0                    Eosinophils %                 2.8                     Basophils %                   0.6                    Neutrophils Absolute          5.45                   Immature Granulocytes Absolute, Au*   0.03                   Lymphocytes Absolute          0.80 (*)               Monocytes Absolute            0.57                   Eosinophils Absolute          0.20                   Basophils Absolute            0.04                COMPREHENSIVE METABOLIC PANEL - Abnormal     Glucose                       119 (*)                Sodium                        145                    Potassium                     3.9                    Chloride                      110 (*)                Bicarbonate                   24                     Urea Nitrogen                 16                     Creatinine                    1.30                   eGFR                          61                     Calcium                       9.2                    Albumin                       3.7                    Alkaline Phosphatase          150 (*)                Total Protein                 6.3                    AST                           18                     Bilirubin, Total              0.3                    ALT                           13                     Anion Gap                     11                  URINALYSIS WITH REFLEX CULTURE AND MICROSCOPIC - Abnormal     Color, Urine                  Dark-Brown (*)               Appearance, Urine             Ex.Turbid (*)               Specific Gravity, Urine       1.024                  pH, Urine                     5.5                    Protein, Urine                50 (1+) (*)               Glucose, Urine                Normal                 Blood, Urine                  OVER (3+) (*)               Ketones, Urine                NEGATIVE                Bilirubin, Urine              NEGATIVE                Urobilinogen, Urine           Normal                 Nitrite, Urine                NEGATIVE                Leukocyte  Esterase, Urine     25 Deni/µL (*)            MICROSCOPIC ONLY, URINE - Abnormal     WBC, Urine                    1-5                    RBC, Urine                    >20 (*)                Budding Yeast, Urine          PRESENT (*)            URINE CULTURE  URINALYSIS WITH REFLEX CULTURE AND MICROSCOPIC       Narrative: The following orders were created for panel order Urinalysis with Reflex Culture and Microscopic.                Procedure                               Abnormality         Status                                   ---------                               -----------         ------                                   Urinalysis with Reflex C...[[id number]]  Abnormal            Final result                             Extra Urine Gray Tube[[id number]]                            Final result                                             Please view results for these tests on the individual orders.  EXTRA URINE GRAY TUBE  CT abdomen pelvis w IV contrast   Final Result    1. Left obstructive uropathy with 4 mm stone at the left    ureteropelvic junction with mild left-sided hydronephrosis          2. Areas of renal cortical scar in bilateral kidneys          3. Cystic hypodensity within the pancreatic tail with diffuse    peripheral rim calcification measuring 4.1 cm stable from remote    imaging including 09/12/2013                MACRO:    None          Signed by: Cintia Lee 8/11/2024 5:23 PM    Dictation workstation:   [id number]     Medications  iohexol (OMNIPaque) 350 mg iodine/mL solution 75 mL (75 mL intravenous Given 8/11/24 1326)  Discharge Medication List as of 8/11/2024  6:59 PM                Procedure  Procedures     Scott Lema PA-C  08/12/24 2709

## 2024-08-12 LAB — HOLD SPECIMEN: NORMAL

## 2024-08-13 ENCOUNTER — LAB REQUISITION (OUTPATIENT)
Dept: LAB | Facility: HOSPITAL | Age: 64
End: 2024-08-13
Payer: MEDICARE

## 2024-08-13 ENCOUNTER — HOSPITAL ENCOUNTER (OUTPATIENT)
Dept: RADIOLOGY | Facility: HOSPITAL | Age: 64
Discharge: HOME | End: 2024-08-13
Payer: MEDICARE

## 2024-08-13 DIAGNOSIS — N20.0 CALCULUS OF KIDNEY: ICD-10-CM

## 2024-08-13 LAB — BACTERIA UR CULT: NO GROWTH

## 2024-08-13 PROCEDURE — 74018 RADEX ABDOMEN 1 VIEW: CPT

## 2024-08-13 PROCEDURE — 82365 CALCULUS SPECTROSCOPY: CPT

## 2024-08-13 PROCEDURE — 74018 RADEX ABDOMEN 1 VIEW: CPT | Performed by: RADIOLOGY

## 2024-08-17 LAB
APPEARANCE STONE: NORMAL
COMPN STONE: NORMAL
SPECIMEN WT: 9 MG

## 2024-08-28 ENCOUNTER — TELEPHONE (OUTPATIENT)
Dept: CARDIOLOGY | Facility: CLINIC | Age: 64
End: 2024-08-28
Payer: MEDICARE

## 2024-08-29 DIAGNOSIS — T85.79XA INFECTION AND INFLAMMATORY REACTION DUE TO OTHER INTERNAL PROSTHETIC DEVICES, IMPLANTS AND GRAFTS, INITIAL ENCOUNTER (CMS-HCC): Primary | ICD-10-CM

## 2024-08-29 RX ORDER — DOXYCYCLINE 100 MG/1
100 CAPSULE ORAL 2 TIMES DAILY
COMMUNITY
End: 2024-08-29 | Stop reason: SDUPTHER

## 2024-08-29 RX ORDER — DOXYCYCLINE 100 MG/1
CAPSULE ORAL
Qty: 60 CAPSULE | Refills: 11 | Status: SHIPPED | OUTPATIENT
Start: 2024-08-29

## 2024-08-30 ENCOUNTER — OFFICE VISIT (OUTPATIENT)
Dept: CARDIOLOGY | Facility: CLINIC | Age: 64
End: 2024-08-30
Payer: MEDICARE

## 2024-08-30 VITALS
OXYGEN SATURATION: 97 % | BODY MASS INDEX: 28.69 KG/M2 | HEART RATE: 68 BPM | WEIGHT: 211.8 LBS | HEIGHT: 72 IN | SYSTOLIC BLOOD PRESSURE: 142 MMHG | DIASTOLIC BLOOD PRESSURE: 86 MMHG

## 2024-08-30 DIAGNOSIS — Q23.1 AORTIC STENOSIS DUE TO BICUSPID AORTIC VALVE (HHS-HCC): Primary | ICD-10-CM

## 2024-08-30 DIAGNOSIS — Q23.0 AORTIC STENOSIS DUE TO BICUSPID AORTIC VALVE (HHS-HCC): Primary | ICD-10-CM

## 2024-08-30 PROCEDURE — 3078F DIAST BP <80 MM HG: CPT | Performed by: INTERNAL MEDICINE

## 2024-08-30 PROCEDURE — 1126F AMNT PAIN NOTED NONE PRSNT: CPT | Performed by: INTERNAL MEDICINE

## 2024-08-30 PROCEDURE — 3008F BODY MASS INDEX DOCD: CPT | Performed by: INTERNAL MEDICINE

## 2024-08-30 PROCEDURE — 99214 OFFICE O/P EST MOD 30 MIN: CPT | Performed by: INTERNAL MEDICINE

## 2024-08-30 PROCEDURE — 3074F SYST BP LT 130 MM HG: CPT | Performed by: INTERNAL MEDICINE

## 2024-08-30 ASSESSMENT — COLUMBIA-SUICIDE SEVERITY RATING SCALE - C-SSRS
1. IN THE PAST MONTH, HAVE YOU WISHED YOU WERE DEAD OR WISHED YOU COULD GO TO SLEEP AND NOT WAKE UP?: NO
2. HAVE YOU ACTUALLY HAD ANY THOUGHTS OF KILLING YOURSELF?: NO
6. HAVE YOU EVER DONE ANYTHING, STARTED TO DO ANYTHING, OR PREPARED TO DO ANYTHING TO END YOUR LIFE?: NO

## 2024-08-30 ASSESSMENT — PATIENT HEALTH QUESTIONNAIRE - PHQ9
1. LITTLE INTEREST OR PLEASURE IN DOING THINGS: NOT AT ALL
2. FEELING DOWN, DEPRESSED OR HOPELESS: NOT AT ALL
SUM OF ALL RESPONSES TO PHQ9 QUESTIONS 1 AND 2: 0

## 2024-08-30 ASSESSMENT — PAIN SCALES - GENERAL: PAINLEVEL: 0-NO PAIN

## 2024-09-01 NOTE — PROGRESS NOTES
Primary Care Physician: Lubna Lugo MD  Date of Visit: 08/30/2024 11:30 AM EDT  [place] [facility] [place] [address]     Chief Complaint:   Chief Complaint   Patient presents with    Follow-up     HPI / Summary:   Dayo Quesada is a 64 y.o. male presents for follow of coronary artery disease, hypertension, hyperlipidemia, heart failure, cardiomyopathy and a routine medication evaluation    ROS    Medical History:   He has no past medical history on file.  Surgical Hx:   He has a past surgical history that includes Other surgical history (07/31/2017); Other surgical history (07/31/2017); CT angio aorta and bilateral iliofemoral runoff w and or wo IV contrast (4/4/2022); CT angio aorta and bilateral iliofemoral runoff w and or wo IV contrast (9/12/2013); CT angio aorta and bilateral iliofemoral runoff w and or wo IV contrast (10/29/2013); CT angio abdomen pelvis w and or wo IV IV contrast (7/28/2015); IR angiogram lower extremity bilateral (Bilateral, 11/5/2015); IR angiogram lower extremity left (Left, 10/28/2015); CT angio aorta and bilateral iliofemoral runoff w and or wo IV contrast (10/5/2016); CT angio aorta and bilateral iliofemoral runoff w and or wo IV contrast (12/14/2016); CT angio abdomen pelvis w and or wo IV IV contrast (11/7/2017); IR angiogram lower extremity left (Left, 11/10/2017); CT angio pelvis w and wo IV contrast (9/1/2018); IR angiogram lower extremity left (Left, 5/3/2019); CT angio abdomen pelvis w and or wo IV IV contrast (3/24/2022); US guided abscess drain (5/18/2022); and MR angio abdomen w and wo IV contrast (5/18/2022).   Social Hx:   He reports that he has been smoking cigarettes. He has never used smokeless tobacco. He reports that he does not drink alcohol and does not use drugs.  Family Hx:   His family history is not on file.   Allergies:  Allergies   Allergen Reactions    Clarithromycin Other, Swelling and Unknown     Outpatient Medications:  Current Outpatient Medications    Medication Instructions    albuterol 90 mcg/actuation inhaler 1-2 puffs, inhalation, Every 6 hours PRN    atorvastatin (LIPITOR) 80 mg, oral, Daily    carvedilol (COREG) 25 mg, oral, 2 times daily (morning and late afternoon)    doxycycline (Monodox) 100 mg capsule Take 1 Capsule Twice daily. Take with at least 8 ounces (large glass) of water, do not lie down for 30 minutes after    Entresto  mg tablet     FLUoxetine (PROzac) 20 mg tablet     furosemide (LASIX) 20 mg, oral, Daily    haloperidol decanoate (Haldol Decanoate) 100 mg/mL injection     methocarbamol (ROBAXIN) 500 mg, oral, 3 times daily PRN    OLANZapine (ZyPREXA) 15 mg tablet     polyethylene glycol (GLYCOLAX, MIRALAX) 17 g, oral, Once    sacubitriL-valsartan (Entresto) 49-51 mg tablet 1 tablet, oral, Every 12 hours    traMADol (ULTRAM) 50 mg, oral, Every 6 hours PRN    Xarelto 20 mg, oral, Daily with evening meal     Physical Exam:  Vitals:    08/30/24 1135 08/30/24 1207   BP: 113/75 142/86   BP Location: Left arm    Patient Position: Sitting    BP Cuff Size: Adult    Pulse: 72 68   SpO2: 97%    Weight: 96.1 kg (211 lb 12.8 oz)    Height: 1.829 m (6')      Wt Readings from Last 5 Encounters:   08/30/24 96.1 kg (211 lb 12.8 oz)   08/11/24 83.5 kg (184 lb)   08/07/24 83.9 kg (185 lb)   05/24/24 89.4 kg (197 lb)   05/10/24 90.5 kg (199 lb 8 oz)     Physical Exam  JVP not elevated. Carotid impulses are 2+ without overlying bruit.   Chest exhibits fair to good air movement with completely clear breath sounds.   The cardiac rhythm is regular with no premature beats.   Normal S1 and S2. No gallop, murmur or rub, or click.   Abdomen is soft and benign without focal tenderness.   With no lower leg edema. The pedal pulses are intact.     Last Labs:  Lab Requisition on 08/13/2024   Component Date Value    Calculi Mass 08/13/2024 9     Calculi Description 08/13/2024 See Note     Calculi Composition 08/13/2024 See Note    Admission on 08/11/2024, Discharged  on 08/11/2024   Component Date Value    WBC 08/11/2024 7.1     nRBC 08/11/2024 0.0     RBC 08/11/2024 4.70     Hemoglobin 08/11/2024 14.4     Hematocrit 08/11/2024 42.3     MCV 08/11/2024 90     MCH 08/11/2024 30.6     MCHC 08/11/2024 34.0     RDW 08/11/2024 14.5     Platelets 08/11/2024 284     Neutrophils % 08/11/2024 76.9     Immature Granulocytes %,* 08/11/2024 0.4     Lymphocytes % 08/11/2024 11.3     Monocytes % 08/11/2024 8.0     Eosinophils % 08/11/2024 2.8     Basophils % 08/11/2024 0.6     Neutrophils Absolute 08/11/2024 5.45     Immature Granulocytes Ab* 08/11/2024 0.03     Lymphocytes Absolute 08/11/2024 0.80 (L)     Monocytes Absolute 08/11/2024 0.57     Eosinophils Absolute 08/11/2024 0.20     Basophils Absolute 08/11/2024 0.04     Glucose 08/11/2024 119 (H)     Sodium 08/11/2024 145     Potassium 08/11/2024 3.9     Chloride 08/11/2024 110 (H)     Bicarbonate 08/11/2024 24     Urea Nitrogen 08/11/2024 16     Creatinine 08/11/2024 1.30     eGFR 08/11/2024 61     Calcium 08/11/2024 9.2     Albumin 08/11/2024 3.7     Alkaline Phosphatase 08/11/2024 150 (H)     Total Protein 08/11/2024 6.3     AST 08/11/2024 18     Bilirubin, Total 08/11/2024 0.3     ALT 08/11/2024 13     Anion Gap 08/11/2024 11     Color, Urine 08/11/2024 Dark-Brown (N)     Appearance, Urine 08/11/2024 Ex.Turbid (N)     Specific Gravity, Urine 08/11/2024 1.024     pH, Urine 08/11/2024 5.5     Protein, Urine 08/11/2024 50 (1+) (A)     Glucose, Urine 08/11/2024 Normal     Blood, Urine 08/11/2024 OVER (3+) (A)     Ketones, Urine 08/11/2024 NEGATIVE     Bilirubin, Urine 08/11/2024 NEGATIVE     Urobilinogen, Urine 08/11/2024 Normal     Nitrite, Urine 08/11/2024 NEGATIVE     Leukocyte Esterase, Urine 08/11/2024 25 Deni/µL (A)     Extra Tube 08/11/2024 Hold for add-ons.     WBC, Urine 08/11/2024 1-5     RBC, Urine 08/11/2024 >20 (A)     Budding Yeast, Urine 08/11/2024 PRESENT (A)     Urine Culture 08/11/2024 No growth    Admission on 05/10/2024,  Discharged on 05/10/2024   Component Date Value    WBC 05/10/2024 4.8     nRBC 05/10/2024 0.0     RBC 05/10/2024 4.30 (L)     Hemoglobin 05/10/2024 12.8 (L)     Hematocrit 05/10/2024 38.2 (L)     MCV 05/10/2024 89     MCH 05/10/2024 29.8     MCHC 05/10/2024 33.5     RDW 05/10/2024 15.5 (H)     Platelets 05/10/2024 240     Neutrophils % 05/10/2024 66.5     Immature Granulocytes %,* 05/10/2024 0.6     Lymphocytes % 05/10/2024 14.3     Monocytes % 05/10/2024 12.2     Eosinophils % 05/10/2024 5.8     Basophils % 05/10/2024 0.6     Neutrophils Absolute 05/10/2024 3.20     Immature Granulocytes Ab* 05/10/2024 0.03     Lymphocytes Absolute 05/10/2024 0.69 (L)     Monocytes Absolute 05/10/2024 0.59     Eosinophils Absolute 05/10/2024 0.28     Basophils Absolute 05/10/2024 0.03     Glucose 05/10/2024 139 (H)     Sodium 05/10/2024 141     Potassium 05/10/2024 3.7     Chloride 05/10/2024 106     Bicarbonate 05/10/2024 25     Urea Nitrogen 05/10/2024 24     Creatinine 05/10/2024 1.40     eGFR 05/10/2024 55 (L)     Calcium 05/10/2024 9.0     Anion Gap 05/10/2024 10     Color, Urine 05/10/2024 Yellow     Appearance, Urine 05/10/2024 Clear     Specific Gravity, Urine 05/10/2024 1.024     pH, Urine 05/10/2024 6.0     Protein, Urine 05/10/2024 10 (TRACE)     Glucose, Urine 05/10/2024 Normal     Blood, Urine 05/10/2024 NEGATIVE     Ketones, Urine 05/10/2024 NEGATIVE     Bilirubin, Urine 05/10/2024 NEGATIVE     Urobilinogen, Urine 05/10/2024 2 (1+) (A)     Nitrite, Urine 05/10/2024 NEGATIVE     Leukocyte Esterase, Urine 05/10/2024 NEGATIVE     Coronavirus 2019, PCR 05/10/2024 Not Detected     WBC, Urine 05/10/2024 1-5     RBC, Urine 05/10/2024 1-2     Mucus, Urine 05/10/2024 FEW         Assessment/Plan   Assessment:     1. CAD. PTCA and bare metal stent to LAD 08/2007 at [facility], Dr Dorsey. Patient did have a screening outpatient IV pharmacological nuclear stress test performed on 12/21/2021 which demonstrated a moderate to large  defect that was fixed consistent with scar involving the apex and inferior wall with nontransmural fixed septal defect again consistent with scarring. The lateral wall appeared to be normal and there was no stress-induced reversible myocardial ischemia. The patient denies any anginal type chest discomfort. Prior to the stress test the patient also had an echocardiogram performed on 10/25/2021 and demonstrating moderate hypokinesis of the mid and distal anteroseptal wall with an LV ejection fraction of 45%. He was found to have a bicuspid aortic valve with a mild degree of stenosis peak systolic pressure gradient of 28 mmHg. There was mild dilatation of the aortic root measuring 3.9 cm. 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     2. Peripheral vascular disease with left femoropopliteal PTFE bypass/stenting of left superficial femoral artery. 11/2017 at [facility] with Dr Arellano. CT angio 09/2018 showed a well patent aortobifemoral bypass graft extending from the infrarenal abdominal aorta down to proximal superficial femoral arteries bilaterally. 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 On asa 81 mg daily. Will continue Xarelto 20 mg daily.      3. Hyperlipidemia. Last FLP done 07/2018 showed chol 146, HDL 26, LDL 76, trig 220. Continue atorvastatin 80 mg daily. Had labwork 08/2020. FLP done 03/2021 showed chol 145, HDL 30, LDL 82, trig 164. The have patient will have lab work repeated including a lipid panel CBC CMP and glycohemoglobin.     4. Ischemic cardiomyopathy/Bicuspid AV. Last echo showed LV mildly dilated, EF 50--55%, mild AV stenosis. Continue carvedilol and entresto as well as furosemide. Echo done today 10/25/2021 showed EF 45%, mild AI, with gradient 28 mm Hg, moderate  hypokinesis of the mid and distal anteroseptal wall, mild diastolic dysfunction, possible bicuspid AV . Plan to repeat echo every two years. The patient's dose of Entresto will be increased from 49/51 mg twice daily to 97/103 mg twice daily. Continue carvedilol 12.5 mg twice daily unchanged along with the Lasix 20 mg daily. Echo done 09/2023 showed EF 55%, mild hypokinesis of the mid to distal half of the anteroseptal wall, bicuspid AV, moderate AS with 53 mm Hg gradient, 4.0 cm aortic dilation, evidence of diastolic dysfunction. Will likely need a new valve in the next 5 years. Will monitor echoes every 15-18 months.  The patient was seen at the [facility] emergency room on 5/10/2024 with cough bilateral chest pain intermittent fever and headache.  Chest x-ray showed some mild interstitial edema trace pleural effusions.  Abdominal CT demonstrated trace bilateral pleural effusions mild interstitial edema cortical scarring of the kidneys bilaterally pancreatic cyst 4.2 x 2.2 cm in diameter.  CBC included hematocrit 38.2.  SMA panel creatinine 1.40 glucose 139.  Nasal swab was negative.  Patient did have some increased nasal drainage.  The patient's echocardiogram 9/11/2023 showed an LV ejection fraction of 50-55% moderate LVH moderate hypokinesis of basal and mid anteroseptal wall moderate to severe aortic valve stenosis peak systolic pressure gradient of 53 mmHg.  Patient has some mild lingering shortness of breath and will begin low-dose Lasix 20 mg daily.  He will return to office in 8/2024 and repeat echo in 11/2024.     5. Schizophrenia. On meds.     6. Hypertension. Well controlled today.     7. Smoking. 1-2 ppd. Cessation encouraged.     8. Hx of covid-19 05/2021 and both vaccines.     9.? Left inguinal hernia. Patient developed pain in a slight area of swelling in the left inguinal region after throwing some material into a dumpster. He will be referred to general surgery to assess for possible  hernia.     10. AAA repair with graft infection. Patient had aortic endograft s/p exploration, aortic reconstruction and left popliteal obturator bypass with right profunda bypass with SFA graft 04/2022. He was seen for antibiotic treatment, but left AMA. Apparently did see ID for antibiotic treatment as outpatient.  The patient currently does have follow-up with vascular surgery.    11.  Aortic valve stenosis.  The patient's echocardiogram on 9/11/2023 in part demonstrated moderately severe aortic valve stenosis with a peak systolic pressure gradient of 53 mmHg.  He does have history of ischemic heart disease with some regional wall motion abnormalities with the echo also demonstrating moderate LVH moderate hypokinesis of basal mid anteroseptal wall and an LV ejection fraction of 50-55%.  Patient will have repeat echocardiogram performed in 11/2024.    12.  History of renal stones.  The patient went to the [facility] emergency room on 8/11/2024 with hematuria.  He was subsequently seen by urology and in the process of being assessed at the office was instructed to urinate at which time he did pass the stone.  Subsequent analysis of the stone is notable for calcium oxalate consistency.  The patient had actually started Xarelto anticoagulation 1 week before he developed the hematuria.  His urine is now clear and he has been able to restart the Xarelto.  Lab work from 8/11/2024 included a normal CBC, creatinine of 1.3, negative urine culture.        Orders:  Orders Placed This Encounter   Procedures    Transthoracic echo (TTE) complete      Followup Appts:  Future Appointments   Date Time Provider Department Center   10/7/2024  2:30 PM [facility] ECHO[facility]STRESS [id number] [facility]    10/7/2024  3:30 PM JESSA Lopez-CNP [id number] [facility]   8/13/2025  9:00 AM MENT [facility] ROOM [id number] [facility] [place] [facility]   8/13/2025 10:00 AM [facility] ROOM [id number] [facility] [place] [facility]   8/13/2025 11:00 AM JESSA Lindo-CNP  [id number] [place]           ____________________________________________________________  MD Emil Montero [facility]    Follow

## 2024-10-07 ENCOUNTER — HOSPITAL ENCOUNTER (OUTPATIENT)
Dept: CARDIOLOGY | Facility: CLINIC | Age: 64
Discharge: HOME | End: 2024-10-07
Payer: MEDICARE

## 2024-10-07 ENCOUNTER — OFFICE VISIT (OUTPATIENT)
Dept: CARDIOLOGY | Facility: CLINIC | Age: 64
End: 2024-10-07
Payer: MEDICARE

## 2024-10-07 VITALS
OXYGEN SATURATION: 98 % | SYSTOLIC BLOOD PRESSURE: 125 MMHG | HEART RATE: 62 BPM | BODY MASS INDEX: 28.21 KG/M2 | DIASTOLIC BLOOD PRESSURE: 84 MMHG | WEIGHT: 208 LBS

## 2024-10-07 DIAGNOSIS — Q23.0 CONGENITAL STENOSIS OF AORTIC VALVE (HHS-HCC): ICD-10-CM

## 2024-10-07 DIAGNOSIS — I50.9 CONGESTIVE HEART FAILURE, UNSPECIFIED HF CHRONICITY, UNSPECIFIED HEART FAILURE TYPE: ICD-10-CM

## 2024-10-07 DIAGNOSIS — R94.31 ABNORMAL ELECTROCARDIOGRAM (ECG) (EKG): ICD-10-CM

## 2024-10-07 DIAGNOSIS — I42.9 CARDIOMYOPATHY, UNSPECIFIED TYPE (MULTI): Primary | ICD-10-CM

## 2024-10-07 PROCEDURE — 3074F SYST BP LT 130 MM HG: CPT | Performed by: NURSE PRACTITIONER

## 2024-10-07 PROCEDURE — 1159F MED LIST DOCD IN RCRD: CPT | Performed by: NURSE PRACTITIONER

## 2024-10-07 PROCEDURE — 1160F RVW MEDS BY RX/DR IN RCRD: CPT | Performed by: NURSE PRACTITIONER

## 2024-10-07 PROCEDURE — 93306 TTE W/DOPPLER COMPLETE: CPT | Performed by: INTERNAL MEDICINE

## 2024-10-07 PROCEDURE — 3079F DIAST BP 80-89 MM HG: CPT | Performed by: NURSE PRACTITIONER

## 2024-10-07 PROCEDURE — 99214 OFFICE O/P EST MOD 30 MIN: CPT | Mod: 25 | Performed by: NURSE PRACTITIONER

## 2024-10-07 PROCEDURE — 1126F AMNT PAIN NOTED NONE PRSNT: CPT | Performed by: NURSE PRACTITIONER

## 2024-10-07 PROCEDURE — 93306 TTE W/DOPPLER COMPLETE: CPT

## 2024-10-07 PROCEDURE — 99214 OFFICE O/P EST MOD 30 MIN: CPT | Performed by: NURSE PRACTITIONER

## 2024-10-07 RX ORDER — ASPIRIN 81 MG/1
81 TABLET ORAL DAILY
COMMUNITY

## 2024-10-07 ASSESSMENT — ENCOUNTER SYMPTOMS
CARDIOVASCULAR NEGATIVE: 1
LOSS OF SENSATION IN FEET: 0
GASTROINTESTINAL NEGATIVE: 1
NEUROLOGICAL NEGATIVE: 1
OCCASIONAL FEELINGS OF UNSTEADINESS: 0
DEPRESSION: 0
SHORTNESS OF BREATH: 1
CONSTITUTIONAL NEGATIVE: 1
MUSCULOSKELETAL NEGATIVE: 1

## 2024-10-07 ASSESSMENT — PAIN SCALES - GENERAL: PAINLEVEL: 0-NO PAIN

## 2024-10-07 NOTE — PROGRESS NOTES
Chief Complaint:   Follow-up (Had echo today. Pt states he's had 3 episodes of a quick sharp pain that went away on its own and he did not need to take any NTG. Pt states he is having some SOB with activity. )    History Of Present Illness:    .Mr Quesada returns in follow up.  Denies chest pain, palpitations or pedal edema. He has chronic sob and has not been taking his inhalers due to bad taste, but is encouraged to do so.  Echo was reviewed by Dr Jones.  Echoes should be done yearly.           Last Recorded Vitals:  Blood pressure 125/84, pulse 62, weight 94.3 kg (208 lb), SpO2 98%.     Past Medical History:  History reviewed. No pertinent past medical history.     Past Surgical History:  Past Surgical History:   Procedure Laterality Date    CT ABDOMEN PELVIS ANGIOGRAM W AND/OR WO IV CONTRAST  7/28/2015    CT ABDOMEN PELVIS ANGIOGRAM W AND/OR WO IV CONTRAST [facility]    CT ABDOMEN PELVIS ANGIOGRAM W AND/OR WO IV CONTRAST  11/7/2017    CT ABDOMEN PELVIS ANGIOGRAM W AND/OR WO IV CONTRAST [facility] INPATIENT LEGACY    CT ABDOMEN PELVIS ANGIOGRAM W AND/OR WO IV CONTRAST  3/24/2022    CT ABDOMEN PELVIS ANGIOGRAM W AND/OR WO IV CONTRAST [facility]    CT AORTA AND BILATERAL ILIOFEMORAL RUNOFF ANGIOGRAM W AND/OR WO IV CONTRAST  4/4/2022    CT AORTA AND BILATERAL ILIOFEMORAL RUNOFF ANGIOGRAM W AND/OR WO IV CONTRAST 4/4/2022 [facility] LEGAC[facility]    CT AORTA AND BILATERAL ILIOFEMORAL RUNOFF ANGIOGRAM W AND/OR WO IV CONTRAST  9/12/2013    CT AORTA AND BILATERAL ILIOFEMORAL RUNOFF ANGIOGRAM W AND/OR WO IV CONTRAST [facility]    CT AORTA AND BILATERAL ILIOFEMORAL RUNOFF ANGIOGRAM W AND/OR WO IV CONTRAST  10/29/2013    CT AORTA AND BILATERAL ILIOFEMORAL RUNOFF ANGIOGRAM W AND/OR WO IV CONTRAST [facility]    CT AORTA AND BILATERAL ILIOFEMORAL RUNOFF ANGIOGRAM W AND/OR WO IV CONTRAST  10/5/2016    CT AORTA AND BILATERAL ILIOFEMORAL RUNOFF ANGIOGRAM W AND/OR WO IV CONTRAST [facility]     CT AORTA AND BILATERAL ILIOFEMORAL RUNOFF ANGIOGRAM W AND/OR WO IV CONTRAST  12/14/2016    CT AORTA AND BILATERAL ILIOFEMORAL RUNOFF ANGIOGRAM W AND/OR WO IV CONTRAST [facility] INPATIENT LEGACY    CT PELVIS ANGIO W AND WO IV CONTRAST  9/1/2018    CT PELVIS ANGIO W AND WO IV CONTRAST [facility] EMERGENCY LEGACY    IR ANGIOGRAM LOWER EXTREMITY BILATERAL Bilateral 11/5/2015    IR ANGIOGRAM LOWER EXTREMITY BILATERAL [facility] INPATIENT LEGACY    IR ANGIOGRAM LOWER EXTREMITY LEFT Left 10/28/2015    IR ANGIOGRAM LOWER EXTREMITY LEFT [facility]    IR ANGIOGRAM LOWER EXTREMITY LEFT Left 11/10/2017    IR ANGIOGRAM LOWER EXTREMITY LEFT [facility] INPATIENT LEGACY    IR ANGIOGRAM LOWER EXTREMITY LEFT Left 5/3/2019    IR ANGIOGRAM LOWER EXTREMITY LEFT [facility] INPATIENT LEGACY    MR ANGIO ABDOMEN W AND WO IV CONTRAST  5/18/2022    MR ABDOMEN ANGIO W AND WO IV CONTRAST [facility] INPATIENT LEGACY    OTHER SURGICAL HISTORY  07/31/2017    Angioplasty    OTHER SURGICAL HISTORY  07/31/2017    Creation Of Pericardial Window    US GUIDED ABSCESS DRAIN  5/18/2022    US GUIDED ABSCESS DRAIN [facility] INPATIENT LEGACY       Social History:  Social History     Socioeconomic History    Marital status: Single   Tobacco Use    Smoking status: Every Day     Current packs/day: 0.50     Types: Cigarettes    Smokeless tobacco: Never   Substance and Sexual Activity    Alcohol use: Never    Drug use: Never       Family History:  No family history on file.      Allergies:  Clarithromycin    Outpatient Medications:  Current Outpatient Medications   Medication Sig Dispense Refill    aspirin 81 mg EC tablet Take 1 tablet (81 mg) by mouth once daily.      atorvastatin (Lipitor) 80 mg tablet Take 1 tablet (80 mg) by mouth once daily. 90 tablet 3    carvedilol (Coreg) 25 mg tablet Take 1 tablet (25 mg) by mouth 2 times a day with meals. 180 tablet 3    doxycycline (Monodox) 100 mg capsule Take 1 Capsule Twice daily. Take with at least 8 ounces (large glass) of water, do not lie down for  30 minutes after 60 capsule 11    FLUoxetine (PROzac) 20 mg tablet       furosemide (Lasix) 20 mg tablet Take 1 tablet (20 mg) by mouth once daily. 90 tablet 1    haloperidol decanoate (Haldol Decanoate) 100 mg/mL injection       OLANZapine (ZyPREXA) 15 mg tablet       polyethylene glycol (Glycolax, Miralax) 17 gram/dose powder Mix 17 g of powder and drink 1 time.      sacubitriL-valsartan (Entresto) 49-51 mg tablet Take 1 tablet by mouth every 12 hours. 180 tablet 3    Xarelto 20 mg tablet Take 1 tablet (20 mg) by mouth once daily in the evening. Take with meals. 90 tablet 3    albuterol 90 mcg/actuation inhaler Inhale 1-2 puffs every 6 hours if needed for wheezing. 18 g 0    Entresto  mg tablet       methocarbamol (Robaxin) 500 mg tablet Take 1 tablet (500 mg) by mouth 3 times a day as needed for muscle spasms.      traMADol (Ultram) 50 mg tablet Take 1 tablet (50 mg) by mouth every 6 hours if needed.       No current facility-administered medications for this visit.     Facility-Administered Medications Ordered in Other Visits   Medication Dose Route Frequency Provider Last Rate Last Admin    perflutren lipid microspheres (Definity) injection 0.5-10 mL of dilution  0.5-10 mL of dilution intravenous Once in imaging Ellie Driscoll, APRN-CNP            Physical Exam:  Cardiovascular:      PMI at left midclavicular line. Normal rate. Regular rhythm. Normal S1. Normal S2.       Murmurs: There is no murmur.      No gallop.  No click. No rub.   Pulses:     Intact distal pulses.   Edema:     Peripheral edema absent.         ROS:  Review of Systems   Constitutional: Negative.   Cardiovascular: Negative.    Respiratory:  Positive for shortness of breath.    Skin: Negative.    Musculoskeletal: Negative.    Gastrointestinal: Negative.    Genitourinary: Negative.    Neurological: Negative.           Last Labs:  CBC -  Lab Results   Component Value Date    WBC 7.1 08/11/2024    HGB 14.4 08/11/2024    HCT 42.3  08/11/2024    MCV 90 08/11/2024     08/11/2024       CMP -  Lab Results   Component Value Date    CALCIUM 9.2 08/11/2024    PHOS 3.9 05/25/2022    PROT 6.3 08/11/2024    ALBUMIN 3.7 08/11/2024    AST 18 08/11/2024    ALT 13 08/11/2024    ALKPHOS 150 (H) 08/11/2024    BILITOT 0.3 08/11/2024       LIPID PANEL -   Lab Results   Component Value Date    CHOL 107 05/08/2023    TRIG 118 05/08/2023    HDL 29.0 (A) 05/08/2023    CHHDL 3.7 05/08/2023    LDLF 54 05/08/2023    VLDL 24 05/08/2023    NHDL 120 07/23/2018       RENAL FUNCTION PANEL -   Lab Results   Component Value Date    GLUCOSE 119 (H) 08/11/2024     08/11/2024    K 3.9 08/11/2024     (H) 08/11/2024    CO2 24 08/11/2024    ANIONGAP 11 08/11/2024    BUN 16 08/11/2024    CREATININE 1.30 08/11/2024    GFRMALE 61 05/08/2023    CALCIUM 9.2 08/11/2024    PHOS 3.9 05/25/2022    ALBUMIN 3.7 08/11/2024        Lab Results   Component Value Date    HGBA1C 5.3 05/08/2023         Assessment/Plan   Problem List Items Addressed This Visit    None      Assessment:     1. CAD. [facility] Patient did have a screening outpatient IV pharmacological nuclear stress test performed on 12/21/2021 which demonstrated a moderate to large defect that was fixed consistent with scar involving the apex and inferior wall with nontransmural fixed septal defect again consistent with scarring. The lateral wall appeared to be normal and there was no stress-induced reversible myocardial ischemia. The patient denies any anginal type chest discomfort. Prior to the stress test the patient also had an echocardiogram performed on 10/25/2021 and demonstrating moderate hypokinesis of the mid and distal anteroseptal wall with an LV ejection fraction of 45%. He was found to have a bicuspid aortic valve with a mild degree of stenosis peak systolic pressure gradient of 28 mmHg. There was mild dilatation of the aortic root measuring  3.9 cm. 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     2. Peripheral vascular disease with left femoropopliteal PTFE bypass/stenting of left superficial femoral artery. 11/2017 at [facility] with Dr Arellano. CT angio 09/2018 showed a well patent aortobifemoral bypass graft extending from the infrarenal abdominal aorta down to proximal superficial femoral arteries bilaterally. 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 On asa 81 mg daily. Will continue Xarelto 20 mg daily.      3. Hyperlipidemia. Last FLP done 07/2018 showed chol 146, HDL 26, LDL 76, trig 220. Continue atorvastatin 80 mg daily. Had labwork 08/2020. FLP done 03/2021 showed chol 145, HDL 30, LDL 82, trig 164. The have patient will have lab work repeated including a lipid panel CBC CMP and glycohemoglobin.     4. Ischemic cardiomyopathy/Bicuspid AV. Last echo showed LV mildly dilated, EF 50--55%, mild AV stenosis. Continue carvedilol and entresto as well as furosemide. Echo done today 10/25/2021 showed EF 45%, mild AI, with gradient 28 mm Hg, moderate hypokinesis of the mid and distal anteroseptal wall, mild diastolic dysfunction, possible bicuspid AV . Plan to repeat echo every two years. The patient's dose of Entresto will be increased from 49/51 mg twice daily to 97/103 mg twice daily. Continue carvedilol 12.5 mg twice daily unchanged along with the Lasix 20 mg daily. Echo done 09/2023 showed EF 55%, mild hypokinesis of the mid to distal half of the anteroseptal wall, bicuspid AV, moderate AS with 53 mm Hg gradient, 4.0 cm aortic dilation, evidence of diastolic dysfunction. Will likely need a new valve in the next 5 years. Will monitor echoes every 15-18 months.  The patient was seen at the [facility] emergency room on  5/10/2024 with cough bilateral chest pain intermittent fever and headache.  Chest x-ray showed some mild interstitial edema trace pleural effusions.  Abdominal CT demonstrated trace bilateral pleural effusions mild interstitial edema cortical scarring of the kidneys bilaterally pancreatic cyst 4.2 x 2.2 cm in diameter.  CBC included hematocrit 38.2.  SMA panel creatinine 1.40 glucose 139.  Nasal swab was negative.  Patient did have some increased nasal drainage.  The patient's echocardiogram 9/11/2023 showed an LV ejection fraction of 50-55% moderate LVH moderate hypokinesis of basal and mid anteroseptal wall moderate to severe aortic valve stenosis peak systolic pressure gradient of 53 mmHg.  Patient has some mild lingering shortness of breath and will begin low-dose Lasix 20 mg daily.  He will return to office in 8/2024 and repeat echo in 11/2024.     5. Schizophrenia. On meds.     6. Hypertension. Well controlled today.     7. Smoking. 1-2 ppd. Cessation encouraged.     8. Hx of covid-19 05/2021 and both vaccines.     9.? Left inguinal hernia. Patient developed pain in a slight area of swelling in the left inguinal region after throwing some material into a dumpster. He will be referred to general surgery to assess for possible hernia.     10. AAA repair with graft infection. Patient had aortic endograft s/p exploration, aortic reconstruction and left popliteal obturator bypass with right profunda bypass with SFA graft 04/2022. He was seen for antibiotic treatment, but left AMA. Apparently did see ID for antibiotic treatment as outpatient.  The patient currently does have follow-up with vascular surgery.     11.  Aortic valve stenosis.  The patient's echocardiogram on 9/11/2023 in part demonstrated moderately severe aortic valve stenosis with a peak systolic pressure gradient of 53 mmHg.  He does have history of ischemic heart disease with some regional wall motion abnormalities with the echo also demonstrating  moderate LVH moderate hypokinesis of basal mid anteroseptal wall and an LV ejection fraction of 50-55%.  Patient will have repeat echocardiogram performed in 11/2024.     12.  History of renal stones.  The patient went to the [facility] emergency room on 8/11/2024 with hematuria.  He was subsequently seen by urology and in the process of being assessed at the office was instructed to urinate at which time he did pass the stone.  Subsequent analysis of the stone is notable for calcium oxalate consistency.  The patient had actually started Xarelto anticoagulation 1 week before he developed the hematuria.  His urine is now clear and he has been able to restart the Xarelto.  Lab work from 8/11/2024 included a normal CBC, creatinine of 1.3, negative urine culture.       Ellie Driscoll, JESSA-CNP

## 2024-10-08 ENCOUNTER — TELEPHONE (OUTPATIENT)
Dept: CARDIOLOGY | Facility: CLINIC | Age: 64
End: 2024-10-08
Payer: MEDICARE

## 2024-10-12 LAB
AORTIC VALVE MEAN GRADIENT: 30 MMHG
AORTIC VALVE PEAK VELOCITY: 3.6 M/S
AV PEAK GRADIENT: 51.7 MMHG
AVA (PEAK VEL): 0.65 CM2
AVA (VTI): 0.65 CM2
EJECTION FRACTION APICAL 4 CHAMBER: 27.6
EJECTION FRACTION: 48 %
LEFT ATRIUM VOLUME AREA LENGTH INDEX BSA: 29 ML/M2
LEFT VENTRICULAR OUTFLOW TRACT DIAMETER: 2.03 CM
MITRAL VALVE E/A RATIO: 0.68
RIGHT VENTRICLE FREE WALL PEAK S': 8.81 CM/S
TRICUSPID ANNULAR PLANE SYSTOLIC EXCURSION: 1.8 CM

## 2024-10-17 ENCOUNTER — TELEPHONE (OUTPATIENT)
Dept: CARDIOLOGY | Facility: CLINIC | Age: 64
End: 2024-10-17
Payer: MEDICARE

## 2024-10-17 DIAGNOSIS — I10 HYPERTENSION: ICD-10-CM

## 2024-10-17 DIAGNOSIS — E78.5 HYPERLIPIDEMIA: ICD-10-CM

## 2024-10-17 RX ORDER — CARVEDILOL 25 MG/1
25 TABLET ORAL
Qty: 180 TABLET | Refills: 3 | Status: SHIPPED | OUTPATIENT
Start: 2024-10-17 | End: 2025-10-17

## 2024-10-17 RX ORDER — ATORVASTATIN CALCIUM 80 MG/1
80 TABLET, FILM COATED ORAL DAILY
Qty: 90 TABLET | Refills: 3 | Status: SHIPPED | OUTPATIENT
Start: 2024-10-17 | End: 2025-10-17

## 2025-01-03 DIAGNOSIS — E78.5 HYPERLIPIDEMIA: ICD-10-CM

## 2025-01-03 DIAGNOSIS — I10 HYPERTENSION: ICD-10-CM

## 2025-01-03 RX ORDER — ATORVASTATIN CALCIUM 80 MG/1
80 TABLET, FILM COATED ORAL DAILY
Qty: 90 TABLET | Refills: 3 | Status: SHIPPED | OUTPATIENT
Start: 2025-01-03 | End: 2026-01-03

## 2025-01-03 RX ORDER — CARVEDILOL 25 MG/1
25 TABLET ORAL
Qty: 180 TABLET | Refills: 3 | Status: SHIPPED | OUTPATIENT
Start: 2025-01-03 | End: 2026-01-03

## 2025-01-14 ENCOUNTER — TELEPHONE (OUTPATIENT)
Dept: CARDIOLOGY | Facility: CLINIC | Age: 65
End: 2025-01-14
Payer: MEDICARE

## 2025-01-14 DIAGNOSIS — I25.10 ARTERIOSCLEROSIS OF CORONARY ARTERY: Primary | ICD-10-CM

## 2025-01-14 RX ORDER — ASPIRIN 81 MG/1
81 TABLET ORAL DAILY
Qty: 90 TABLET | Refills: 3 | Status: SHIPPED | OUTPATIENT
Start: 2025-01-14 | End: 2026-01-14

## 2025-01-15 ENCOUNTER — TELEPHONE (OUTPATIENT)
Dept: CARDIOLOGY | Facility: CLINIC | Age: 65
End: 2025-01-15
Payer: MEDICARE

## 2025-01-15 DIAGNOSIS — I50.9 HEART FAILURE, UNSPECIFIED: ICD-10-CM

## 2025-01-15 RX ORDER — SACUBITRIL AND VALSARTAN 49; 51 MG/1; MG/1
1 TABLET, FILM COATED ORAL EVERY 12 HOURS
Qty: 180 TABLET | Refills: 3 | Status: SHIPPED | OUTPATIENT
Start: 2025-01-15 | End: 2026-01-15

## 2025-02-25 ENCOUNTER — APPOINTMENT (OUTPATIENT)
Dept: CARDIOLOGY | Facility: HOSPITAL | Age: 65
End: 2025-02-25
Payer: MEDICARE

## 2025-02-25 ENCOUNTER — HOSPITAL ENCOUNTER (EMERGENCY)
Facility: HOSPITAL | Age: 65
Discharge: HOME | End: 2025-02-25
Attending: EMERGENCY MEDICINE
Payer: MEDICARE

## 2025-02-25 ENCOUNTER — APPOINTMENT (OUTPATIENT)
Dept: RADIOLOGY | Facility: HOSPITAL | Age: 65
End: 2025-02-25
Payer: MEDICARE

## 2025-02-25 VITALS
RESPIRATION RATE: 20 BRPM | DIASTOLIC BLOOD PRESSURE: 83 MMHG | WEIGHT: 226.19 LBS | HEART RATE: 78 BPM | BODY MASS INDEX: 30.64 KG/M2 | SYSTOLIC BLOOD PRESSURE: 129 MMHG | TEMPERATURE: 97.9 F | HEIGHT: 72 IN | OXYGEN SATURATION: 91 %

## 2025-02-25 DIAGNOSIS — R53.83 MALAISE AND FATIGUE: ICD-10-CM

## 2025-02-25 DIAGNOSIS — R53.81 MALAISE AND FATIGUE: ICD-10-CM

## 2025-02-25 DIAGNOSIS — N28.9 RENAL INSUFFICIENCY: ICD-10-CM

## 2025-02-25 DIAGNOSIS — M54.6 ACUTE LEFT-SIDED THORACIC BACK PAIN: ICD-10-CM

## 2025-02-25 DIAGNOSIS — J44.1 COPD EXACERBATION (MULTI): ICD-10-CM

## 2025-02-25 DIAGNOSIS — M79.10 MYALGIA: ICD-10-CM

## 2025-02-25 DIAGNOSIS — R06.02 SHORTNESS OF BREATH: Primary | ICD-10-CM

## 2025-02-25 LAB
ALBUMIN SERPL BCP-MCNC: 3.8 G/DL (ref 3.4–5)
ALP SERPL-CCNC: 140 U/L (ref 33–136)
ALT SERPL W P-5'-P-CCNC: 19 U/L (ref 10–52)
ANION GAP SERPL CALCULATED.3IONS-SCNC: 11 MMOL/L (ref 10–20)
APPEARANCE UR: CLEAR
AST SERPL W P-5'-P-CCNC: 16 U/L (ref 9–39)
BASOPHILS # BLD AUTO: 0.03 X10*3/UL (ref 0–0.1)
BASOPHILS NFR BLD AUTO: 0.4 %
BILIRUB SERPL-MCNC: 0.5 MG/DL (ref 0–1.2)
BILIRUB UR STRIP.AUTO-MCNC: NEGATIVE MG/DL
BNP SERPL-MCNC: 46 PG/ML (ref 0–99)
BUN SERPL-MCNC: 17 MG/DL (ref 6–23)
CALCIUM SERPL-MCNC: 8.7 MG/DL (ref 8.6–10.3)
CARDIAC TROPONIN I PNL SERPL HS: 10 NG/L (ref 0–20)
CARDIAC TROPONIN I PNL SERPL HS: 12 NG/L (ref 0–20)
CHLORIDE SERPL-SCNC: 106 MMOL/L (ref 98–107)
CO2 SERPL-SCNC: 24 MMOL/L (ref 21–32)
COLOR UR: NORMAL
CREAT SERPL-MCNC: 1.45 MG/DL (ref 0.5–1.3)
EGFRCR SERPLBLD CKD-EPI 2021: 53 ML/MIN/1.73M*2
EOSINOPHIL # BLD AUTO: 0.24 X10*3/UL (ref 0–0.7)
EOSINOPHIL NFR BLD AUTO: 3.4 %
ERYTHROCYTE [DISTWIDTH] IN BLOOD BY AUTOMATED COUNT: 14.4 % (ref 11.5–14.5)
FLUAV RNA RESP QL NAA+PROBE: NOT DETECTED
FLUBV RNA RESP QL NAA+PROBE: NOT DETECTED
GLUCOSE SERPL-MCNC: 88 MG/DL (ref 74–99)
GLUCOSE UR STRIP.AUTO-MCNC: NORMAL MG/DL
HCT VFR BLD AUTO: 38.6 % (ref 41–52)
HGB BLD-MCNC: 12.8 G/DL (ref 13.5–17.5)
IMM GRANULOCYTES # BLD AUTO: 0.07 X10*3/UL (ref 0–0.7)
IMM GRANULOCYTES NFR BLD AUTO: 1 % (ref 0–0.9)
KETONES UR STRIP.AUTO-MCNC: NEGATIVE MG/DL
LEUKOCYTE ESTERASE UR QL STRIP.AUTO: NEGATIVE
LIPASE SERPL-CCNC: 23 U/L (ref 9–82)
LYMPHOCYTES # BLD AUTO: 0.67 X10*3/UL (ref 1.2–4.8)
LYMPHOCYTES NFR BLD AUTO: 9.5 %
MCH RBC QN AUTO: 29.4 PG (ref 26–34)
MCHC RBC AUTO-ENTMCNC: 33.2 G/DL (ref 32–36)
MCV RBC AUTO: 89 FL (ref 80–100)
MONOCYTES # BLD AUTO: 0.64 X10*3/UL (ref 0.1–1)
MONOCYTES NFR BLD AUTO: 9.1 %
NEUTROPHILS # BLD AUTO: 5.4 X10*3/UL (ref 1.2–7.7)
NEUTROPHILS NFR BLD AUTO: 76.6 %
NITRITE UR QL STRIP.AUTO: NEGATIVE
NRBC BLD-RTO: 0 /100 WBCS (ref 0–0)
PH UR STRIP.AUTO: 5.5 [PH]
PLATELET # BLD AUTO: 287 X10*3/UL (ref 150–450)
POTASSIUM SERPL-SCNC: 3.9 MMOL/L (ref 3.5–5.3)
PROT SERPL-MCNC: 6.7 G/DL (ref 6.4–8.2)
PROT UR STRIP.AUTO-MCNC: NEGATIVE MG/DL
RBC # BLD AUTO: 4.35 X10*6/UL (ref 4.5–5.9)
RBC # UR STRIP.AUTO: NEGATIVE MG/DL
SARS-COV-2 RNA RESP QL NAA+PROBE: NOT DETECTED
SODIUM SERPL-SCNC: 137 MMOL/L (ref 136–145)
SP GR UR STRIP.AUTO: 1.01
UROBILINOGEN UR STRIP.AUTO-MCNC: NORMAL MG/DL
WBC # BLD AUTO: 7.1 X10*3/UL (ref 4.4–11.3)

## 2025-02-25 PROCEDURE — 71275 CT ANGIOGRAPHY CHEST: CPT

## 2025-02-25 PROCEDURE — 36415 COLL VENOUS BLD VENIPUNCTURE: CPT | Performed by: EMERGENCY MEDICINE

## 2025-02-25 PROCEDURE — 84484 ASSAY OF TROPONIN QUANT: CPT | Performed by: EMERGENCY MEDICINE

## 2025-02-25 PROCEDURE — 83690 ASSAY OF LIPASE: CPT | Performed by: EMERGENCY MEDICINE

## 2025-02-25 PROCEDURE — 93005 ELECTROCARDIOGRAM TRACING: CPT

## 2025-02-25 PROCEDURE — 96361 HYDRATE IV INFUSION ADD-ON: CPT

## 2025-02-25 PROCEDURE — 74177 CT ABD & PELVIS W/CONTRAST: CPT | Performed by: STUDENT IN AN ORGANIZED HEALTH CARE EDUCATION/TRAINING PROGRAM

## 2025-02-25 PROCEDURE — 81003 URINALYSIS AUTO W/O SCOPE: CPT | Performed by: EMERGENCY MEDICINE

## 2025-02-25 PROCEDURE — 2550000001 HC RX 255 CONTRASTS: Performed by: EMERGENCY MEDICINE

## 2025-02-25 PROCEDURE — 84075 ASSAY ALKALINE PHOSPHATASE: CPT | Performed by: EMERGENCY MEDICINE

## 2025-02-25 PROCEDURE — 87636 SARSCOV2 & INF A&B AMP PRB: CPT | Performed by: EMERGENCY MEDICINE

## 2025-02-25 PROCEDURE — 96375 TX/PRO/DX INJ NEW DRUG ADDON: CPT

## 2025-02-25 PROCEDURE — 99285 EMERGENCY DEPT VISIT HI MDM: CPT | Mod: 25

## 2025-02-25 PROCEDURE — 94640 AIRWAY INHALATION TREATMENT: CPT

## 2025-02-25 PROCEDURE — 83880 ASSAY OF NATRIURETIC PEPTIDE: CPT | Performed by: EMERGENCY MEDICINE

## 2025-02-25 PROCEDURE — 2500000002 HC RX 250 W HCPCS SELF ADMINISTERED DRUGS (ALT 637 FOR MEDICARE OP, ALT 636 FOR OP/ED): Performed by: EMERGENCY MEDICINE

## 2025-02-25 PROCEDURE — 2500000004 HC RX 250 GENERAL PHARMACY W/ HCPCS (ALT 636 FOR OP/ED): Mod: JZ | Performed by: EMERGENCY MEDICINE

## 2025-02-25 PROCEDURE — 99291 CRITICAL CARE FIRST HOUR: CPT | Performed by: EMERGENCY MEDICINE

## 2025-02-25 PROCEDURE — 96374 THER/PROPH/DIAG INJ IV PUSH: CPT

## 2025-02-25 PROCEDURE — 85025 COMPLETE CBC W/AUTO DIFF WBC: CPT | Performed by: EMERGENCY MEDICINE

## 2025-02-25 PROCEDURE — 71260 CT THORAX DX C+: CPT | Performed by: STUDENT IN AN ORGANIZED HEALTH CARE EDUCATION/TRAINING PROGRAM

## 2025-02-25 RX ORDER — IPRATROPIUM BROMIDE AND ALBUTEROL SULFATE 2.5; .5 MG/3ML; MG/3ML
3 SOLUTION RESPIRATORY (INHALATION) ONCE
Status: COMPLETED | OUTPATIENT
Start: 2025-02-25 | End: 2025-02-25

## 2025-02-25 RX ORDER — MORPHINE SULFATE 4 MG/ML
4 INJECTION, SOLUTION INTRAMUSCULAR; INTRAVENOUS ONCE
Status: COMPLETED | OUTPATIENT
Start: 2025-02-25 | End: 2025-02-25

## 2025-02-25 RX ORDER — ONDANSETRON HYDROCHLORIDE 2 MG/ML
4 INJECTION, SOLUTION INTRAVENOUS ONCE
Status: COMPLETED | OUTPATIENT
Start: 2025-02-25 | End: 2025-02-25

## 2025-02-25 RX ADMIN — METHYLPREDNISOLONE SODIUM SUCCINATE 125 MG: 125 INJECTION, POWDER, FOR SOLUTION INTRAMUSCULAR; INTRAVENOUS at 17:11

## 2025-02-25 RX ADMIN — MORPHINE SULFATE 4 MG: 4 INJECTION, SOLUTION INTRAMUSCULAR; INTRAVENOUS at 14:34

## 2025-02-25 RX ADMIN — IPRATROPIUM BROMIDE AND ALBUTEROL SULFATE 3 ML: 2.5; .5 SOLUTION RESPIRATORY (INHALATION) at 17:09

## 2025-02-25 RX ADMIN — IOHEXOL 75 ML: 350 INJECTION, SOLUTION INTRAVENOUS at 15:07

## 2025-02-25 RX ADMIN — SODIUM CHLORIDE 1000 ML: 900 INJECTION, SOLUTION INTRAVENOUS at 17:09

## 2025-02-25 RX ADMIN — ONDANSETRON 4 MG: 2 INJECTION, SOLUTION INTRAMUSCULAR; INTRAVENOUS at 14:34

## 2025-02-25 ASSESSMENT — PAIN SCALES - GENERAL
PAINLEVEL_OUTOF10: 1
PAINLEVEL_OUTOF10: 10 - WORST POSSIBLE PAIN

## 2025-02-25 ASSESSMENT — COLUMBIA-SUICIDE SEVERITY RATING SCALE - C-SSRS
6. HAVE YOU EVER DONE ANYTHING, STARTED TO DO ANYTHING, OR PREPARED TO DO ANYTHING TO END YOUR LIFE?: NO
1. IN THE PAST MONTH, HAVE YOU WISHED YOU WERE DEAD OR WISHED YOU COULD GO TO SLEEP AND NOT WAKE UP?: NO
2. HAVE YOU ACTUALLY HAD ANY THOUGHTS OF KILLING YOURSELF?: NO

## 2025-02-25 ASSESSMENT — PAIN - FUNCTIONAL ASSESSMENT: PAIN_FUNCTIONAL_ASSESSMENT: 0-10

## 2025-02-25 NOTE — DISCHARGE INSTRUCTIONS
Follow-up with your primary care physician within 1 to 2 days for further management of your current symptoms and repeat check of your blood pressure.      Return to the emergency department sooner for worsening symptoms or onset of new symptoms

## 2025-02-25 NOTE — Clinical Note
Dayo Quesada was seen and treated in our emergency department on 2/25/2025.  He may return to work on 02/26/2025.       If you have any questions or concerns, please don't hesitate to call.      Elida Bae MD

## 2025-02-25 NOTE — ED PROVIDER NOTES
HPI   Chief Complaint   Patient presents with    Back Pain    Shortness of Breath     Pt states he has been having shortness of breath and coughing that is causing back pain.        HPI        Patient History   History reviewed. No pertinent past medical history.  Past Surgical History:   Procedure Laterality Date    CT ABDOMEN PELVIS ANGIOGRAM W AND/OR WO IV CONTRAST  7/28/2015    CT ABDOMEN PELVIS ANGIOGRAM W AND/OR WO IV CONTRAST [facility]    CT ABDOMEN PELVIS ANGIOGRAM W AND/OR WO IV CONTRAST  11/7/2017    CT ABDOMEN PELVIS ANGIOGRAM W AND/OR WO IV CONTRAST [facility]    CT ABDOMEN PELVIS ANGIOGRAM W AND/OR WO IV CONTRAST  3/24/2022    CT ABDOMEN PELVIS ANGIOGRAM W AND/OR WO IV CONTRAST [facility]    CT ANGIO AORTA AND BILATERAL ILIOFEMORAL RUN OFF INCLUDING WITHOUT CONTRAST IF PERFORMED  4/4/2022    CT AORTA AND BILATERAL ILIOFEMORAL RUNOFF ANGIOGRAM W AND/OR WO IV CONTRAST 4/4/2022 [facility]    CT ANGIO AORTA AND BILATERAL ILIOFEMORAL RUN OFF INCLUDING WITHOUT CONTRAST IF PERFORMED  9/12/2013    CT AORTA AND BILATERAL ILIOFEMORAL RUNOFF ANGIOGRAM W AND/OR WO IV CONTRAST [facility]    CT ANGIO AORTA AND BILATERAL ILIOFEMORAL RUN OFF INCLUDING WITHOUT CONTRAST IF PERFORMED  10/29/2013    CT AORTA AND BILATERAL ILIOFEMORAL RUNOFF ANGIOGRAM W AND/OR WO IV CONTRAST [facility]    CT ANGIO AORTA AND BILATERAL ILIOFEMORAL RUN OFF INCLUDING WITHOUT CONTRAST IF PERFORMED  10/5/2016    CT AORTA AND BILATERAL ILIOFEMORAL RUNOFF ANGIOGRAM W AND/OR WO IV CONTRAST [facility]    CT ANGIO AORTA AND BILATERAL ILIOFEMORAL RUN OFF INCLUDING WITHOUT CONTRAST IF PERFORMED  12/14/2016    CT AORTA AND BILATERAL ILIOFEMORAL RUNOFF ANGIOGRAM W AND/OR WO IV CONTRAST [facility]    CT PELVIS ANGIO W AND WO IV CONTRAST  9/1/2018    CT PELVIS ANGIO W AND WO IV CONTRAST [facility]    IR ANGIOGRAM LOWER EXTREMITY BILATERAL Bilateral 11/5/2015    IR  ANGIOGRAM LOWER EXTREMITY BILATERAL [facility] INPATIENT LEGACY    IR ANGIOGRAM LOWER EXTREMITY LEFT Left 10/28/2015    IR ANGIOGRAM LOWER EXTREMITY LEFT [facility] INPATIENT LEGACY    IR ANGIOGRAM LOWER EXTREMITY LEFT Left 11/10/2017    IR ANGIOGRAM LOWER EXTREMITY LEFT [facility] INPATIENT LEGACY    IR ANGIOGRAM LOWER EXTREMITY LEFT Left 5/3/2019    IR ANGIOGRAM LOWER EXTREMITY LEFT [facility] INPATIENT LEGACY    MR ANGIO ABDOMEN W AND WO IV CONTRAST  5/18/2022    MR ABDOMEN ANGIO W AND WO IV CONTRAST [facility] INPATIENT LEGACY    OTHER SURGICAL HISTORY  07/31/2017    Angioplasty    OTHER SURGICAL HISTORY  07/31/2017    Creation Of Pericardial Window    US GUIDED ABSCESS DRAIN  5/18/2022    US GUIDED ABSCESS DRAIN [facility] INPATIENT LEGACY     No family history on file.  Social History     Tobacco Use    Smoking status: Every Day     Current packs/day: 0.50     Types: Cigarettes    Smokeless tobacco: Never   Substance Use Topics    Alcohol use: Never    Drug use: Never       Physical Exam   ED Triage Vitals [02/25/25 1356]   Temperature Heart Rate Respirations BP   36.6 °C (97.9 °F) 78 20 129/83      Pulse Ox Temp src Heart Rate Source Patient Position   (!) 91 % -- -- Sitting      BP Location FiO2 (%)     Right arm --       Physical Exam      ED Course & MDM   Diagnoses as of 02/25/25 1522   Shortness of breath   COPD exacerbation (Multi)   Acute left-sided thoracic back pain   Malaise and fatigue   Myalgia   Renal insufficiency                 No data recorded                                 Medical Decision Making    The patient is a 65-year-old male presenting to the emergency department for evaluation of left upper back pain, diffuse myalgias, shortness of breath, cough and congestion.  He states he has had symptoms for the past week or so.  He states that sometimes he is coughing so hard that he thinks that is what is causing the pain all over.  He states he does not have any focal chest pain or abdominal pain but he just feels sore all over.  He  states that the worst pain is in the left upper back.  He states that sometimes it is a little worse when he moves but sometimes it is spasming even when he is sitting still.  He states that he does have an extensive history of vascular issues and has had previous surgery where they had to do surgery on the veins and arteries in his legs.  He states that he quit smoking in December 2024.  He states he did have the major vascular surgery and April or May 2024.  He denies any headache or visual changes.  No focal weakness or numbness.  No fever or chills.  No palpitations.  No diaphoresis.  All pertinent positives and negatives are recorded above.  All other systems reviewed and otherwise negative.  Vital signs with borderline hypoxia and diastolic hypertension but otherwise within normal limits.  Physical exam with a well-nourished well-developed male with disheveled appearance but no evidence of acute distress.  HEENT exam with dry mucous membranes.  He does have a scant wheeze on lung exam.  Abdominal exam is benign.  He does not have any evidence of airway compromise or respiratory distress other than a scant wheeze on lung exam.  Abdominal exam is benign.  No midline neck or back pain with palpation.  No step-offs.  Strength is 5 of 5 in all 4 extremities.  Sensation is intact.  Pulses are intact bilaterally.      EKG with normal sinus rhythm at 78 bpm, normal axis, normal voltage, normal ST segment, and a diffuse flattening of the T waves      DuoNebs, IV Solu-Medrol, IV fluids, IV morphine and IV Zofran ordered      Diagnostic labs with mild renal insufficiency but otherwise unremarkable.      Initial troponin 10.  Repeat troponin 12      CT angio chest abdomen pelvis   Final Result   No thoracoabdominal aortic dissection.        Additional findings as detailed.        MACRO   None        Signed by: Roma Pillai 2/25/2025 4:44 PM   Dictation workstation:   [id number]           The patient does not have any  evidence of airway compromise or respiratory distress on exam other than wheezing that did improve with medications given in the emergency room..  He does not have any gross motor, neurologic or vascular deficits on exam..  He has no evidence of STEMI on EKG or cardiac enzymes.  No events on telemetry.  He has some mild renal insufficiency but otherwise does not have any significant lab abnormalities.  CT angio chest abdomen pelvis shows no evidence of dissection or PE.  No evidence of pneumonia or pneumothorax.  No evidence of CHF.  His trial of ambulation showed a pulse ox of greater than 96% at all times on room air the patient states he feels much better and he is ready to be released.      The patient was released in good condition.  He will follow-up with his primary care physician within 1 to 2 days for further management of his current symptoms.  He will return to the emergency department sooner with worsening of symptoms or onset of new symptoms.  He will continue his previously prescribed medications for his COPD.      Impression/diagnosis  Cough and congestion  Malaise and fatigue  Thoracic back pain  Diffuse myalgias  Renal insufficiency  COPD exacerbation      Critical care time of 33 minutes billed for management of COPD exacerbation with initiation of DuoNebs and IV Solu-Medrol, monitoring the patient on telemetry, consultation with the patient regarding his results,.  This time excludes time for billable procedures.      I independently interpreted the results of the EKG and diagnostic labs      I reviewed the results of the diagnostic labs and diagnostic imaging.  Formal radiology reading was completed by the radiologist    Procedure  Procedures     Elida Bae MD  02/25/25 [id number]

## 2025-02-25 NOTE — ED PROCEDURE NOTE
Procedure  Critical Care    Performed by: Elida Bae MD  Authorized by: Elida Bae MD    Critical care provider statement:     Critical care time (minutes):  33    Critical care time was exclusive of:  Separately billable procedures and treating other patients and teaching time    Critical care was necessary to treat or prevent imminent or life-threatening deterioration of the following conditions:  Respiratory failure    Critical care was time spent personally by me on the following activities:  Obtaining history from patient or surrogate, examination of patient, evaluation of patient's response to treatment, development of treatment plan with patient or surrogate, blood draw for specimens, ordering and performing treatments and interventions, ordering and review of laboratory studies, ordering and review of radiographic studies, pulse oximetry and re-evaluation of patient's condition  Comments:      Critical care time of 33 minutes billed for management of COPD exacerbation with initiation of DuoNebs and IV Solu-Medrol, monitoring the patient on telemetry, consultation with the patient regarding his results,.  This time excludes time for billable procedures.               Elida Bae MD  02/25/25 [id number]

## 2025-02-26 LAB
ATRIAL RATE: 78 BPM
P AXIS: 32 DEGREES
P OFFSET: 188 MS
P ONSET: 126 MS
PR INTERVAL: 178 MS
Q ONSET: 215 MS
QRS COUNT: 13 BEATS
QRS DURATION: 108 MS
QT INTERVAL: 372 MS
QTC CALCULATION(BAZETT): 424 MS
QTC FREDERICIA: 406 MS
R AXIS: -1 DEGREES
T AXIS: 38 DEGREES
T OFFSET: 401 MS
VENTRICULAR RATE: 78 BPM

## 2025-02-28 ENCOUNTER — HOSPITAL ENCOUNTER (OUTPATIENT)
Dept: CARDIOLOGY | Facility: CLINIC | Age: 65
Discharge: HOME | End: 2025-02-28
Payer: MEDICARE

## 2025-02-28 DIAGNOSIS — Q23.0 AORTIC STENOSIS DUE TO BICUSPID AORTIC VALVE: ICD-10-CM

## 2025-02-28 DIAGNOSIS — Q23.81 AORTIC STENOSIS DUE TO BICUSPID AORTIC VALVE: ICD-10-CM

## 2025-02-28 PROCEDURE — 93306 TTE W/DOPPLER COMPLETE: CPT

## 2025-03-03 LAB
AORTIC VALVE MEAN GRADIENT: 38 MMHG
AORTIC VALVE PEAK VELOCITY: 4.12 M/S
AV PEAK GRADIENT: 68 MMHG
AVA (PEAK VEL): 0.69 CM2
AVA (VTI): 0.77 CM2
EJECTION FRACTION APICAL 4 CHAMBER: 47.9
EJECTION FRACTION: 48 %
LEFT ATRIUM VOLUME AREA LENGTH INDEX BSA: 22.1 ML/M2
LEFT VENTRICLE INTERNAL DIMENSION DIASTOLE: 5.02 CM (ref 3.5–6)
LEFT VENTRICULAR OUTFLOW TRACT DIAMETER: 2.22 CM
MITRAL VALVE E/A RATIO: 0.88
RIGHT VENTRICLE FREE WALL PEAK S': 10 CM/S
RIGHT VENTRICLE PEAK SYSTOLIC PRESSURE: 20.8 MMHG
TRICUSPID ANNULAR PLANE SYSTOLIC EXCURSION: 2.7 CM

## 2025-03-18 DIAGNOSIS — I82.409 ACUTE EMBOLISM AND THROMBOSIS OF UNSPECIFIED DEEP VEINS OF UNSPECIFIED LOWER EXTREMITY (MULTI): Primary | ICD-10-CM

## 2025-03-18 RX ORDER — RIVAROXABAN 20 MG/1
20 TABLET, FILM COATED ORAL
Qty: 90 TABLET | Refills: 3 | Status: SHIPPED | OUTPATIENT
Start: 2025-03-18 | End: 2026-03-18

## 2025-03-24 ENCOUNTER — OFFICE VISIT (OUTPATIENT)
Dept: CARDIOLOGY | Facility: CLINIC | Age: 65
End: 2025-03-24
Payer: MEDICARE

## 2025-03-24 VITALS
WEIGHT: 222 LBS | OXYGEN SATURATION: 96 % | HEART RATE: 72 BPM | DIASTOLIC BLOOD PRESSURE: 80 MMHG | BODY MASS INDEX: 30.11 KG/M2 | SYSTOLIC BLOOD PRESSURE: 124 MMHG

## 2025-03-24 DIAGNOSIS — I42.9 CARDIOMYOPATHY, UNSPECIFIED TYPE (MULTI): ICD-10-CM

## 2025-03-24 DIAGNOSIS — I25.10 ARTERIOSCLEROSIS OF CORONARY ARTERY: ICD-10-CM

## 2025-03-24 DIAGNOSIS — R06.02 SHORTNESS OF BREATH: Primary | ICD-10-CM

## 2025-03-24 DIAGNOSIS — R60.9 EDEMA, UNSPECIFIED TYPE: ICD-10-CM

## 2025-03-24 DIAGNOSIS — E78.5 HYPERLIPIDEMIA, UNSPECIFIED HYPERLIPIDEMIA TYPE: ICD-10-CM

## 2025-03-24 DIAGNOSIS — I10 ESSENTIAL HYPERTENSION: ICD-10-CM

## 2025-03-24 PROCEDURE — 99214 OFFICE O/P EST MOD 30 MIN: CPT | Performed by: NURSE PRACTITIONER

## 2025-03-24 PROCEDURE — 1036F TOBACCO NON-USER: CPT | Performed by: NURSE PRACTITIONER

## 2025-03-24 PROCEDURE — 1160F RVW MEDS BY RX/DR IN RCRD: CPT | Performed by: NURSE PRACTITIONER

## 2025-03-24 PROCEDURE — 1159F MED LIST DOCD IN RCRD: CPT | Performed by: NURSE PRACTITIONER

## 2025-03-24 PROCEDURE — 3079F DIAST BP 80-89 MM HG: CPT | Performed by: NURSE PRACTITIONER

## 2025-03-24 PROCEDURE — 3074F SYST BP LT 130 MM HG: CPT | Performed by: NURSE PRACTITIONER

## 2025-03-24 PROCEDURE — G2211 COMPLEX E/M VISIT ADD ON: HCPCS | Performed by: NURSE PRACTITIONER

## 2025-03-24 PROCEDURE — 1126F AMNT PAIN NOTED NONE PRSNT: CPT | Performed by: NURSE PRACTITIONER

## 2025-03-24 RX ORDER — FUROSEMIDE 20 MG/1
20 TABLET ORAL DAILY
Qty: 90 TABLET | Refills: 3 | Status: SHIPPED | OUTPATIENT
Start: 2025-03-24 | End: 2026-03-24

## 2025-03-24 ASSESSMENT — ENCOUNTER SYMPTOMS
GASTROINTESTINAL NEGATIVE: 1
DEPRESSION: 0
RESPIRATORY NEGATIVE: 1
CARDIOVASCULAR NEGATIVE: 1
OCCASIONAL FEELINGS OF UNSTEADINESS: 0
MUSCULOSKELETAL NEGATIVE: 1
LOSS OF SENSATION IN FEET: 0
CONSTITUTIONAL NEGATIVE: 1

## 2025-03-24 ASSESSMENT — PAIN SCALES - GENERAL: PAINLEVEL_OUTOF10: 0-NO PAIN

## 2025-03-24 NOTE — H&P (VIEW-ONLY)
Chief Complaint:   Mr Quesada is here in follow up for CAD, hyperlipidemia, ischemic CM, bicuspid AV with severe stenosis, hypertension.    History Of Present Illness:    .Mr Quesada returns in follow up.  Denies chest pain, palpitations or pedal edema.  Has increased sob and will have him see structural and pulmonary.  Quit smoking in 12/2024.         Last Recorded Vitals:  Blood pressure 124/80, pulse 72, weight 101 kg (222 lb), SpO2 96%.     Past Medical History:  History reviewed. No pertinent past medical history.     Past Surgical History:  Past Surgical History:   Procedure Laterality Date    CT ABDOMEN PELVIS ANGIOGRAM W AND/OR WO IV CONTRAST  7/28/2015    CT ABDOMEN PELVIS ANGIOGRAM W AND/OR WO IV CONTRAST [facility]    CT ABDOMEN PELVIS ANGIOGRAM W AND/OR WO IV CONTRAST  11/7/2017    CT ABDOMEN PELVIS ANGIOGRAM W AND/OR WO IV CONTRAST [facility]    CT ABDOMEN PELVIS ANGIOGRAM W AND/OR WO IV CONTRAST  3/24/2022    CT ABDOMEN PELVIS ANGIOGRAM W AND/OR WO IV CONTRAST [facility]    CT ANGIO AORTA AND BILATERAL ILIOFEMORAL RUN OFF INCLUDING WITHOUT CONTRAST IF PERFORMED  4/4/2022    CT AORTA AND BILATERAL ILIOFEMORAL RUNOFF ANGIOGRAM W AND/OR WO IV CONTRAST 4/4/2022 [facility]    CT ANGIO AORTA AND BILATERAL ILIOFEMORAL RUN OFF INCLUDING WITHOUT CONTRAST IF PERFORMED  9/12/2013    CT AORTA AND BILATERAL ILIOFEMORAL RUNOFF ANGIOGRAM W AND/OR WO IV CONTRAST [facility]    CT ANGIO AORTA AND BILATERAL ILIOFEMORAL RUN OFF INCLUDING WITHOUT CONTRAST IF PERFORMED  10/29/2013    CT AORTA AND BILATERAL ILIOFEMORAL RUNOFF ANGIOGRAM W AND/OR WO IV CONTRAST [facility]    CT ANGIO AORTA AND BILATERAL ILIOFEMORAL RUN OFF INCLUDING WITHOUT CONTRAST IF PERFORMED  10/5/2016    CT AORTA AND BILATERAL ILIOFEMORAL RUNOFF ANGIOGRAM W AND/OR WO IV CONTRAST [facility]    CT ANGIO AORTA AND BILATERAL ILIOFEMORAL RUN OFF INCLUDING WITHOUT CONTRAST IF PERFORMED   2016    CT AORTA AND BILATERAL ILIOFEMORAL RUNOFF ANGIOGRAM W AND/OR WO IV CONTRAST [facility]GAC[facility]    CT PELVIS ANGIO W AND WO IV CONTRAST  2018    CT PELVIS ANGIO W AND WO IV CONTRAST [facility]GENCY LEGACY    IR ANGIOGRAM LOWER EXTREMITY BILATERAL Bilateral 2015    IR ANGIOGRAM LOWER EXTREMITY BILATERAL [facility]PATIENT [facility]GAC[facility]    IR ANGIOGRAM LOWER EXTREMITY LEFT Left 10/28/2015    IR ANGIOGRAM LOWER EXTREMITY LEFT [facility]    IR ANGIOGRAM LOWER EXTREMITY LEFT Left 11/10/2017    IR ANGIOGRAM LOWER EXTREMITY LEFT [facility]GAC[facility]    IR ANGIOGRAM LOWER EXTREMITY LEFT Left 5/3/2019    IR ANGIOGRAM LOWER EXTREMITY LEFT [facility]PATIENT [facility]GAC[facility]    MR ANGIO ABDOMEN W AND WO IV CONTRAST  2022    MR ABDOMEN ANGIO W AND WO IV CONTRAST [facility]PATIENT [facility]GAC[facility]    OTHER SURGICAL HISTORY  2017    Angioplasty    OTHER SURGICAL HISTORY  2017    Creation Of Pericardial Window    US GUIDED ABSCESS DRAIN  2022    US GUIDED ABSCESS DRAIN [facility] INPATIENT LEGACY       Social History:  Social History     Socioeconomic History    Marital status: Single   Tobacco Use    Smoking status: Former     Current packs/day: 0.00     Types: Cigarettes     Quit date: 2024     Years since quittin.2    Smokeless tobacco: Never   Substance and Sexual Activity    Alcohol use: Never    Drug use: Never       Family History:  No family history on file.      Allergies:  Clarithromycin    Outpatient Medications:  Current Outpatient Medications   Medication Sig Dispense Refill    albuterol 90 mcg/actuation inhaler Inhale 1-2 puffs every 6 hours if needed for wheezing. 18 g 0    aspirin 81 mg EC tablet Take 1 tablet (81 mg) by mouth once daily. 90 tablet 3    atorvastatin (Lipitor) 80 mg tablet Take 1 tablet (80 mg) by mouth once daily. 90 tablet 3    carvedilol (Coreg) 25 mg tablet Take 1 tablet (25 mg) by mouth 2 times daily (morning and late afternoon). 180 tablet 3    doxycycline (Monodox) 100 mg  capsule Take 1 Capsule Twice daily. Take with at least 8 ounces (large glass) of water, do not lie down for 30 minutes after 60 capsule 11    FLUoxetine (PROzac) 20 mg tablet       haloperidol decanoate (Haldol Decanoate) 100 mg/mL injection       OLANZapine (ZyPREXA) 15 mg tablet       polyethylene glycol (Glycolax, Miralax) 17 gram/dose powder Mix 17 g of powder and drink 1 time.      sacubitriL-valsartan (Entresto) 49-51 mg tablet Take 1 tablet by mouth every 12 hours. 180 tablet 3    Xarelto 20 mg tablet Take 1 tablet (20 mg) by mouth once daily in the evening. Take with meals. 90 tablet 3    furosemide (Lasix) 20 mg tablet Take 1 tablet (20 mg) by mouth once daily. (Patient not taking: Reported on 3/24/2025) 90 tablet 1     No current facility-administered medications for this visit.        Physical Exam:  Cardiovascular:      PMI at left midclavicular line. Normal rate. Regular rhythm. Normal S1. Normal S2.       Murmurs: There is no murmur.      No gallop.  No click. No rub.   Pulses:     Intact distal pulses.   Edema:     Peripheral edema absent.         ROS:  Review of Systems   Constitutional: Negative.   Cardiovascular: Negative.    Respiratory: Negative.     Skin: Negative.    Musculoskeletal: Negative.    Gastrointestinal: Negative.    Genitourinary: Negative.           Last Labs:  Reviewed at [facility] 03/24/2025.  CBC -  Lab Results   Component Value Date    WBC 7.1 02/25/2025    HGB 12.8 (L) 02/25/2025    HCT 38.6 (L) 02/25/2025    MCV 89 02/25/2025     02/25/2025       CMP -  Lab Results   Component Value Date    CALCIUM 8.7 02/25/2025    PHOS 3.9 05/25/2022    PROT 6.7 02/25/2025    ALBUMIN 3.8 02/25/2025    AST 16 02/25/2025    ALT 19 02/25/2025    ALKPHOS 140 (H) 02/25/2025    BILITOT 0.5 02/25/2025       LIPID PANEL -   Lab Results   Component Value Date    CHOL 107 05/08/2023    TRIG 118 05/08/2023    HDL 29.0 (A) 05/08/2023    CHHDL 3.7 05/08/2023    LDLF 54 05/08/2023    VLDL 24 05/08/2023     NHDL 120 07/23/2018       RENAL FUNCTION PANEL -   Lab Results   Component Value Date    GLUCOSE 88 02/25/2025     02/25/2025    K 3.9 02/25/2025     02/25/2025    CO2 24 02/25/2025    ANIONGAP 11 02/25/2025    BUN 17 02/25/2025    CREATININE 1.45 (H) 02/25/2025    GFRMALE 61 05/08/2023    CALCIUM 8.7 02/25/2025    PHOS 3.9 05/25/2022    ALBUMIN 3.8 02/25/2025        Lab Results   Component Value Date    BNP 46 02/25/2025    HGBA1C 5.3 05/08/2023         Assessment/Plan   Problem List Items Addressed This Visit    None    1. CAD. PTCA and bare metal stent to LAD 08/2007 at [facility], Dr Dorsey. Patient did have a screening outpatient IV pharmacological nuclear stress test performed on 12/21/2021 which demonstrated a moderate to large defect that was fixed consistent with scar involving the apex and inferior wall with nontransmural fixed septal defect again consistent with scarring. The lateral wall appeared to be normal and there was no stress-induced reversible myocardial ischemia. The patient denies any anginal type chest discomfort. Prior to the stress test the patient also had an echocardiogram performed on 10/25/2021 and demonstrating moderate hypokinesis of the mid and distal anteroseptal wall with an LV ejection fraction of 45%. He was found to have a bicuspid aortic valve with a mild degree of stenosis peak systolic pressure gradient of 28 mmHg. There was mild dilatation of the aortic root measuring 3.9 cm. 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 Patient is now experiencing more sob and will be referred to structural heart to assess his aortic valve and may need possible heart cath.     Echo 02/2025  CONCLUSIONS:   1. The left ventricular systolic function is mildly decreased, with a visually estimated ejection fraction of 45-50%.   2. Abnormal left venticular wall motion.   3. Spectral Doppler shows an abnormal pattern of left  ventricular diastolic filling.   4. There is moderate hypokinesis of the mid and distal anteroseptal wall.   5. The aortic valve appears bicuspid with two aortic sinuses in a laterolateral orientation.   6. Moderate to severe aortic valve stenosis.   7. Upper limits of normal size aortic root.   8. The peak instantaneous gradient of the aortic valve is 68 mmHg.     2. Peripheral vascular disease with left femoropopliteal PTFE bypass/stenting of left superficial femoral artery. 11/2017 at [facility] with Dr Arellano. CT angio 09/2018 showed a well patent aortobifemoral bypass graft extending from the infrarenal abdominal aorta down to proximal superficial femoral arteries bilaterally. 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 On asa 81 mg daily. Will continue Xarelto 20 mg daily.   Follows with vascular, Jose David Farmer.     3. Hyperlipidemia. Last FLP done 07/2018 showed chol 146, HDL 26, LDL 76, trig 220. Continue atorvastatin 80 mg daily. Had labwork 08/2020. FLP done 05/2023 showed chol 107, HDL 29, LDL 54, trig 118.      4. Ischemic cardiomyopathy/Bicuspid AV. Last echo showed LV mildly dilated, EF 50--55%, mild AV stenosis. Continue carvedilol and entresto as well as furosemide. Echo done 10/25/2021 showed EF 45%, mild AI, with gradient 28 mm Hg, moderate hypokinesis of the mid and distal anteroseptal wall, mild diastolic dysfunction, possible bicuspid AV . Plan to repeat echo every two years. The patient's dose of Entresto will be increased from 49/51 mg twice daily to 97/103 mg twice daily. Continue carvedilol 12.5 mg twice daily unchanged along with the Lasix 20 mg daily. Echo done 09/2023 showed EF 55%, mild hypokinesis of the mid to distal half of the anteroseptal wall, bicuspid AV, moderate AS with 53 mm Hg gradient, 4.0 cm aortic dilation,  evidence of diastolic dysfunction. Will likely need a new valve in the next 5 years. Will monitor echoes every 15-18 months.  The patient was seen at the [facility] emergency room on 5/10/2024 with cough bilateral chest pain intermittent fever and headache.  Chest x-ray showed some mild interstitial edema trace pleural effusions.  Abdominal CT demonstrated trace bilateral pleural effusions mild interstitial edema cortical scarring of the kidneys bilaterally pancreatic cyst 4.2 x 2.2 cm in diameter.  CBC included hematocrit 38.2.  SMA panel creatinine 1.40 glucose 139.  Nasal swab was negative.  Patient did have some increased nasal drainage.  The patient's echocardiogram 9/11/2023 showed an LV ejection fraction of 50-55% moderate LVH moderate hypokinesis of basal and mid anteroseptal wall moderate to severe aortic valve stenosis peak systolic pressure gradient of 53 mmHg.  Patient has some mild lingering shortness of breath and will begin low-dose Lasix 20 mg daily.  Is on carvedilol 25 mg twice daily and Entresto now 49-51 mg twice daily with his low dose furosemide.     5. Schizophrenia. On meds.     6. Hypertension. Well controlled today with bp 124/80.     7. Quit smoking, 12/06/2024.     8. Hx of covid-19 05/2021 and both vaccines.     9.? Left inguinal hernia. Patient developed pain in a slight area of swelling in the left inguinal region after throwing some material into a dumpster. He was referred to general surgery to assess for possible hernia.     10. AAA repair with graft infection. Patient had aortic endograft s/p exploration, aortic reconstruction and left popliteal obturator bypass with right profunda bypass with SFA graft 04/2022. He was seen for antibiotic treatment, but left AMA. Apparently did see ID for antibiotic treatment as outpatient.  The patient currently does have follow-up with vascular surgery.     11.  Aortic valve stenosis.  The patient's echocardiogram on 9/11/2023 in part  demonstrated moderately severe aortic valve stenosis with a peak systolic pressure gradient of 53 mmHg.  He does have history of ischemic heart disease with some regional wall motion abnormalities with the echo also demonstrating moderate LVH moderate hypokinesis of basal mid anteroseptal wall and an LV ejection fraction of 50-55%.  Patient had repeat echocardiogram performed in 11/2024 and 02/2025 and is referred to structural heart.     12.  History of renal stones.  The patient went to the [facility] emergency room on 8/11/2024 with hematuria.  He was subsequently seen by urology and in the process of being assessed at the office was instructed to urinate at which time he did pass the stone.  Subsequent analysis of the stone is notable for calcium oxalate consistency.  The patient had actually started Xarelto anticoagulation 1 week before he developed the hematuria.  His urine is now clear and he has been able to restart the Xarelto.  Lab work from 8/11/2024 included a normal CBC, creatinine of 1.3, negative urine culture.         Ellie Driscoll, JESSA-CNP

## 2025-03-24 NOTE — PROGRESS NOTES
Chief Complaint:   Mr Quesada is here in follow up for CAD, hyperlipidemia, ischemic CM, bicuspid AV with severe stenosis, hypertension.    History Of Present Illness:    .Mr Quesada returns in follow up.  Denies chest pain, palpitations or pedal edema.  Has increased sob and will have him see structural and pulmonary.  Quit smoking in 12/2024.         Last Recorded Vitals:  Blood pressure 124/80, pulse 72, weight 101 kg (222 lb), SpO2 96%.     Past Medical History:  History reviewed. No pertinent past medical history.     Past Surgical History:  Past Surgical History:   Procedure Laterality Date    CT ABDOMEN PELVIS ANGIOGRAM W AND/OR WO IV CONTRAST  7/28/2015    CT ABDOMEN PELVIS ANGIOGRAM W AND/OR WO IV CONTRAST [facility]    CT ABDOMEN PELVIS ANGIOGRAM W AND/OR WO IV CONTRAST  11/7/2017    CT ABDOMEN PELVIS ANGIOGRAM W AND/OR WO IV CONTRAST [facility]    CT ABDOMEN PELVIS ANGIOGRAM W AND/OR WO IV CONTRAST  3/24/2022    CT ABDOMEN PELVIS ANGIOGRAM W AND/OR WO IV CONTRAST [facility]    CT ANGIO AORTA AND BILATERAL ILIOFEMORAL RUN OFF INCLUDING WITHOUT CONTRAST IF PERFORMED  4/4/2022    CT AORTA AND BILATERAL ILIOFEMORAL RUNOFF ANGIOGRAM W AND/OR WO IV CONTRAST 4/4/2022 [facility]    CT ANGIO AORTA AND BILATERAL ILIOFEMORAL RUN OFF INCLUDING WITHOUT CONTRAST IF PERFORMED  9/12/2013    CT AORTA AND BILATERAL ILIOFEMORAL RUNOFF ANGIOGRAM W AND/OR WO IV CONTRAST [facility]    CT ANGIO AORTA AND BILATERAL ILIOFEMORAL RUN OFF INCLUDING WITHOUT CONTRAST IF PERFORMED  10/29/2013    CT AORTA AND BILATERAL ILIOFEMORAL RUNOFF ANGIOGRAM W AND/OR WO IV CONTRAST [facility]    CT ANGIO AORTA AND BILATERAL ILIOFEMORAL RUN OFF INCLUDING WITHOUT CONTRAST IF PERFORMED  10/5/2016    CT AORTA AND BILATERAL ILIOFEMORAL RUNOFF ANGIOGRAM W AND/OR WO IV CONTRAST [facility]    CT ANGIO AORTA AND BILATERAL ILIOFEMORAL RUN OFF INCLUDING WITHOUT CONTRAST IF PERFORMED   2016    CT AORTA AND BILATERAL ILIOFEMORAL RUNOFF ANGIOGRAM W AND/OR WO IV CONTRAST [facility]    CT PELVIS ANGIO W AND WO IV CONTRAST  2018    CT PELVIS ANGIO W AND WO IV CONTRAST [facility]Y [facility]GAC[facility]    IR ANGIOGRAM LOWER EXTREMITY BILATERAL Bilateral 2015    IR ANGIOGRAM LOWER EXTREMITY BILATERAL [facility]    IR ANGIOGRAM LOWER EXTREMITY LEFT Left 10/28/2015    IR ANGIOGRAM LOWER EXTREMITY LEFT [facility]    IR ANGIOGRAM LOWER EXTREMITY LEFT Left 11/10/2017    IR ANGIOGRAM LOWER EXTREMITY LEFT [facility]    IR ANGIOGRAM LOWER EXTREMITY LEFT Left 5/3/2019    IR ANGIOGRAM LOWER EXTREMITY LEFT [facility]    MR ANGIO ABDOMEN W AND WO IV CONTRAST  2022    MR ABDOMEN ANGIO W AND WO IV CONTRAST [facility]    OTHER SURGICAL HISTORY  2017    Angioplasty    OTHER SURGICAL HISTORY  2017    Creation Of Pericardial Window    US GUIDED ABSCESS DRAIN  2022    US GUIDED ABSCESS DRAIN [facility]GAC[facility]       Social History:  Social History     Socioeconomic History    Marital status: Single   Tobacco Use    Smoking status: Former     Current packs/day: 0.00     Types: Cigarettes     Quit date: 2024     Years since quittin.2    Smokeless tobacco: Never   Substance and Sexual Activity    Alcohol use: Never    Drug use: Never       Family History:  No family history on file.      Allergies:  Clarithromycin    Outpatient Medications:  Current Outpatient Medications   Medication Sig Dispense Refill    albuterol 90 mcg/actuation inhaler Inhale 1-2 puffs every 6 hours if needed for wheezing. 18 g 0    aspirin 81 mg EC tablet Take 1 tablet (81 mg) by mouth once daily. 90 tablet 3    atorvastatin (Lipitor) 80 mg tablet Take 1 tablet (80 mg) by mouth once daily. 90 tablet 3    carvedilol (Coreg) 25 mg tablet Take 1 tablet (25 mg) by mouth 2 times daily (morning and late afternoon). 180 tablet 3    doxycycline (Monodox) 100 mg  capsule Take 1 Capsule Twice daily. Take with at least 8 ounces (large glass) of water, do not lie down for 30 minutes after 60 capsule 11    FLUoxetine (PROzac) 20 mg tablet       haloperidol decanoate (Haldol Decanoate) 100 mg/mL injection       OLANZapine (ZyPREXA) 15 mg tablet       polyethylene glycol (Glycolax, Miralax) 17 gram/dose powder Mix 17 g of powder and drink 1 time.      sacubitriL-valsartan (Entresto) 49-51 mg tablet Take 1 tablet by mouth every 12 hours. 180 tablet 3    Xarelto 20 mg tablet Take 1 tablet (20 mg) by mouth once daily in the evening. Take with meals. 90 tablet 3    furosemide (Lasix) 20 mg tablet Take 1 tablet (20 mg) by mouth once daily. (Patient not taking: Reported on 3/24/2025) 90 tablet 1     No current facility-administered medications for this visit.        Physical Exam:  Cardiovascular:      PMI at left midclavicular line. Normal rate. Regular rhythm. Normal S1. Normal S2.       Murmurs: There is no murmur.      No gallop.  No click. No rub.   Pulses:     Intact distal pulses.   Edema:     Peripheral edema absent.         ROS:  Review of Systems   Constitutional: Negative.   Cardiovascular: Negative.    Respiratory: Negative.     Skin: Negative.    Musculoskeletal: Negative.    Gastrointestinal: Negative.    Genitourinary: Negative.           Last Labs:  Reviewed at [facility] 03/24/2025.  CBC -  Lab Results   Component Value Date    WBC 7.1 02/25/2025    HGB 12.8 (L) 02/25/2025    HCT 38.6 (L) 02/25/2025    MCV 89 02/25/2025     02/25/2025       CMP -  Lab Results   Component Value Date    CALCIUM 8.7 02/25/2025    PHOS 3.9 05/25/2022    PROT 6.7 02/25/2025    ALBUMIN 3.8 02/25/2025    AST 16 02/25/2025    ALT 19 02/25/2025    ALKPHOS 140 (H) 02/25/2025    BILITOT 0.5 02/25/2025       LIPID PANEL -   Lab Results   Component Value Date    CHOL 107 05/08/2023    TRIG 118 05/08/2023    HDL 29.0 (A) 05/08/2023    CHHDL 3.7 05/08/2023    LDLF 54 05/08/2023    VLDL 24 05/08/2023     NHDL 120 07/23/2018       RENAL FUNCTION PANEL -   Lab Results   Component Value Date    GLUCOSE 88 02/25/2025     02/25/2025    K 3.9 02/25/2025     02/25/2025    CO2 24 02/25/2025    ANIONGAP 11 02/25/2025    BUN 17 02/25/2025    CREATININE 1.45 (H) 02/25/2025    GFRMALE 61 05/08/2023    CALCIUM 8.7 02/25/2025    PHOS 3.9 05/25/2022    ALBUMIN 3.8 02/25/2025        Lab Results   Component Value Date    BNP 46 02/25/2025    HGBA1C 5.3 05/08/2023         Assessment/Plan   Problem List Items Addressed This Visit    None    1. CAD. PTCA and bare metal stent to LAD 08/2007 at [facility], Dr Dorsey. Patient did have a screening outpatient IV pharmacological nuclear stress test performed on 12/21/2021 which demonstrated a moderate to large defect that was fixed consistent with scar involving the apex and inferior wall with nontransmural fixed septal defect again consistent with scarring. The lateral wall appeared to be normal and there was no stress-induced reversible myocardial ischemia. The patient denies any anginal type chest discomfort. Prior to the stress test the patient also had an echocardiogram performed on 10/25/2021 and demonstrating moderate hypokinesis of the mid and distal anteroseptal wall with an LV ejection fraction of 45%. He was found to have a bicuspid aortic valve with a mild degree of stenosis peak systolic pressure gradient of 28 mmHg. There was mild dilatation of the aortic root measuring 3.9 cm. 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 Patient is now experiencing more sob and will be referred to structural heart to assess his aortic valve and may need possible heart cath.     Echo 02/2025  CONCLUSIONS:   1. The left ventricular systolic function is mildly decreased, with a visually estimated ejection fraction of 45-50%.   2. Abnormal left venticular wall motion.   3. Spectral Doppler shows an abnormal pattern of left  ventricular diastolic filling.   4. There is moderate hypokinesis of the mid and distal anteroseptal wall.   5. The aortic valve appears bicuspid with two aortic sinuses in a laterolateral orientation.   6. Moderate to severe aortic valve stenosis.   7. Upper limits of normal size aortic root.   8. The peak instantaneous gradient of the aortic valve is 68 mmHg.     2. Peripheral vascular disease with left femoropopliteal PTFE bypass/stenting of left superficial femoral artery. 11/2017 at [facility] with Dr Arellano. CT angio 09/2018 showed a well patent aortobifemoral bypass graft extending from the infrarenal abdominal aorta down to proximal superficial femoral arteries bilaterally. 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 On asa 81 mg daily. Will continue Xarelto 20 mg daily.   Follows with vascular, Jose David Farmer.     3. Hyperlipidemia. Last FLP done 07/2018 showed chol 146, HDL 26, LDL 76, trig 220. Continue atorvastatin 80 mg daily. Had labwork 08/2020. FLP done 05/2023 showed chol 107, HDL 29, LDL 54, trig 118.      4. Ischemic cardiomyopathy/Bicuspid AV. Last echo showed LV mildly dilated, EF 50--55%, mild AV stenosis. Continue carvedilol and entresto as well as furosemide. Echo done 10/25/2021 showed EF 45%, mild AI, with gradient 28 mm Hg, moderate hypokinesis of the mid and distal anteroseptal wall, mild diastolic dysfunction, possible bicuspid AV . Plan to repeat echo every two years. The patient's dose of Entresto will be increased from 49/51 mg twice daily to 97/103 mg twice daily. Continue carvedilol 12.5 mg twice daily unchanged along with the Lasix 20 mg daily. Echo done 09/2023 showed EF 55%, mild hypokinesis of the mid to distal half of the anteroseptal wall, bicuspid AV, moderate AS with 53 mm Hg gradient, 4.0 cm aortic dilation,  evidence of diastolic dysfunction. Will likely need a new valve in the next 5 years. Will monitor echoes every 15-18 months.  The patient was seen at the [facility] emergency room on 5/10/2024 with cough bilateral chest pain intermittent fever and headache.  Chest x-ray showed some mild interstitial edema trace pleural effusions.  Abdominal CT demonstrated trace bilateral pleural effusions mild interstitial edema cortical scarring of the kidneys bilaterally pancreatic cyst 4.2 x 2.2 cm in diameter.  CBC included hematocrit 38.2.  SMA panel creatinine 1.40 glucose 139.  Nasal swab was negative.  Patient did have some increased nasal drainage.  The patient's echocardiogram 9/11/2023 showed an LV ejection fraction of 50-55% moderate LVH moderate hypokinesis of basal and mid anteroseptal wall moderate to severe aortic valve stenosis peak systolic pressure gradient of 53 mmHg.  Patient has some mild lingering shortness of breath and will begin low-dose Lasix 20 mg daily.  Is on carvedilol 25 mg twice daily and Entresto now 49-51 mg twice daily with his low dose furosemide.     5. Schizophrenia. On meds.     6. Hypertension. Well controlled today with bp 124/80.     7. Quit smoking, 12/06/2024.     8. Hx of covid-19 05/2021 and both vaccines.     9.? Left inguinal hernia. Patient developed pain in a slight area of swelling in the left inguinal region after throwing some material into a dumpster. He was referred to general surgery to assess for possible hernia.     10. AAA repair with graft infection. Patient had aortic endograft s/p exploration, aortic reconstruction and left popliteal obturator bypass with right profunda bypass with SFA graft 04/2022. He was seen for antibiotic treatment, but left AMA. Apparently did see ID for antibiotic treatment as outpatient.  The patient currently does have follow-up with vascular surgery.     11.  Aortic valve stenosis.  The patient's echocardiogram on 9/11/2023 in part  demonstrated moderately severe aortic valve stenosis with a peak systolic pressure gradient of 53 mmHg.  He does have history of ischemic heart disease with some regional wall motion abnormalities with the echo also demonstrating moderate LVH moderate hypokinesis of basal mid anteroseptal wall and an LV ejection fraction of 50-55%.  Patient had repeat echocardiogram performed in 11/2024 and 02/2025 and is referred to structural heart.     12.  History of renal stones.  The patient went to the [facility] emergency room on 8/11/2024 with hematuria.  He was subsequently seen by urology and in the process of being assessed at the office was instructed to urinate at which time he did pass the stone.  Subsequent analysis of the stone is notable for calcium oxalate consistency.  The patient had actually started Xarelto anticoagulation 1 week before he developed the hematuria.  His urine is now clear and he has been able to restart the Xarelto.  Lab work from 8/11/2024 included a normal CBC, creatinine of 1.3, negative urine culture.         Ellie Driscoll, JESSA-CNP

## 2025-03-25 DIAGNOSIS — I35.0 AORTIC VALVE STENOSIS, ETIOLOGY OF CARDIAC VALVE DISEASE UNSPECIFIED: ICD-10-CM

## 2025-04-02 ENCOUNTER — TELEPHONE (OUTPATIENT)
Dept: CARDIOLOGY | Facility: CLINIC | Age: 65
End: 2025-04-02
Payer: MEDICARE

## 2025-04-02 DIAGNOSIS — I25.10 ARTERIOSCLEROSIS OF CORONARY ARTERY: ICD-10-CM

## 2025-04-02 DIAGNOSIS — I42.9 CARDIOMYOPATHY: Primary | ICD-10-CM

## 2025-04-02 DIAGNOSIS — I35.0 NONRHEUMATIC AORTIC (VALVE) STENOSIS: ICD-10-CM

## 2025-04-02 DIAGNOSIS — I50.22 CHRONIC SYSTOLIC CONGESTIVE HEART FAILURE: ICD-10-CM

## 2025-04-15 LAB
ANION GAP SERPL CALCULATED.4IONS-SCNC: 12 MMOL/L (CALC) (ref 7–17)
BUN SERPL-MCNC: 16 MG/DL (ref 7–25)
BUN/CREAT SERPL: ABNORMAL (CALC) (ref 6–22)
CALCIUM SERPL-MCNC: 9.2 MG/DL (ref 8.6–10.3)
CHLORIDE SERPL-SCNC: 104 MMOL/L (ref 98–110)
CO2 SERPL-SCNC: 23 MMOL/L (ref 20–32)
CREAT SERPL-MCNC: 1.34 MG/DL (ref 0.7–1.35)
EGFRCR SERPLBLD CKD-EPI 2021: 58 ML/MIN/1.73M2
ERYTHROCYTE [DISTWIDTH] IN BLOOD BY AUTOMATED COUNT: 14.1 % (ref 11–15)
GLUCOSE SERPL-MCNC: 152 MG/DL (ref 65–139)
HCT VFR BLD AUTO: 40.3 % (ref 38.5–50)
HGB BLD-MCNC: 13.2 G/DL (ref 13.2–17.1)
INR PPP: 1
MCH RBC QN AUTO: 29.5 PG (ref 27–33)
MCHC RBC AUTO-ENTMCNC: 32.8 G/DL (ref 32–36)
MCV RBC AUTO: 90.2 FL (ref 80–100)
PLATELET # BLD AUTO: 298 THOUSAND/UL (ref 140–400)
PMV BLD REES-ECKER: 9.8 FL (ref 7.5–12.5)
POTASSIUM SERPL-SCNC: 4.1 MMOL/L (ref 3.5–5.3)
PROTHROMBIN TIME: 10.9 SEC (ref 9–11.5)
RBC # BLD AUTO: 4.47 MILLION/UL (ref 4.2–5.8)
SODIUM SERPL-SCNC: 139 MMOL/L (ref 135–146)
WBC # BLD AUTO: 6.6 THOUSAND/UL (ref 3.8–10.8)

## 2025-04-17 ENCOUNTER — APPOINTMENT (OUTPATIENT)
Dept: CARDIOLOGY | Facility: HOSPITAL | Age: 65
End: 2025-04-17
Payer: MEDICARE

## 2025-04-17 ENCOUNTER — HOSPITAL ENCOUNTER (OUTPATIENT)
Facility: HOSPITAL | Age: 65
Setting detail: OUTPATIENT SURGERY
Discharge: HOME | End: 2025-04-17
Attending: INTERNAL MEDICINE | Admitting: INTERNAL MEDICINE
Payer: MEDICARE

## 2025-04-17 VITALS
RESPIRATION RATE: 16 BRPM | HEART RATE: 68 BPM | WEIGHT: 222.66 LBS | BODY MASS INDEX: 30.16 KG/M2 | DIASTOLIC BLOOD PRESSURE: 91 MMHG | TEMPERATURE: 97.3 F | SYSTOLIC BLOOD PRESSURE: 153 MMHG | HEIGHT: 72 IN | OXYGEN SATURATION: 99 %

## 2025-04-17 DIAGNOSIS — I20.9 ANGINA PECTORIS, UNSPECIFIED: ICD-10-CM

## 2025-04-17 DIAGNOSIS — I35.0 NONRHEUMATIC AORTIC (VALVE) STENOSIS: Primary | ICD-10-CM

## 2025-04-17 DIAGNOSIS — I65.29 STENOSIS OF CAROTID ARTERY: ICD-10-CM

## 2025-04-17 LAB
ATRIAL RATE: 61 BPM
P AXIS: 43 DEGREES
P OFFSET: 176 MS
P ONSET: 122 MS
PR INTERVAL: 188 MS
Q ONSET: 216 MS
QRS COUNT: 10 BEATS
QRS DURATION: 108 MS
QT INTERVAL: 414 MS
QTC CALCULATION(BAZETT): 416 MS
QTC FREDERICIA: 416 MS
R AXIS: 6 DEGREES
T AXIS: 24 DEGREES
T OFFSET: 423 MS
VENTRICULAR RATE: 61 BPM

## 2025-04-17 PROCEDURE — 99152 MOD SED SAME PHYS/QHP 5/>YRS: CPT | Performed by: INTERNAL MEDICINE

## 2025-04-17 PROCEDURE — 99153 MOD SED SAME PHYS/QHP EA: CPT | Performed by: INTERNAL MEDICINE

## 2025-04-17 PROCEDURE — 93005 ELECTROCARDIOGRAM TRACING: CPT

## 2025-04-17 PROCEDURE — C1760 CLOSURE DEV, VASC: HCPCS | Performed by: INTERNAL MEDICINE

## 2025-04-17 PROCEDURE — 93458 L HRT ARTERY/VENTRICLE ANGIO: CPT | Performed by: INTERNAL MEDICINE

## 2025-04-17 PROCEDURE — 2550000001 HC RX 255 CONTRASTS: Performed by: INTERNAL MEDICINE

## 2025-04-17 PROCEDURE — 2500000004 HC RX 250 GENERAL PHARMACY W/ HCPCS (ALT 636 FOR OP/ED): Performed by: INTERNAL MEDICINE

## 2025-04-17 PROCEDURE — 2720000007 HC OR 272 NO HCPCS: Performed by: INTERNAL MEDICINE

## 2025-04-17 PROCEDURE — 93010 ELECTROCARDIOGRAM REPORT: CPT | Performed by: INTERNAL MEDICINE

## 2025-04-17 PROCEDURE — 7100000010 HC PHASE TWO TIME - EACH INCREMENTAL 1 MINUTE: Performed by: INTERNAL MEDICINE

## 2025-04-17 PROCEDURE — 7100000009 HC PHASE TWO TIME - INITIAL BASE CHARGE: Performed by: INTERNAL MEDICINE

## 2025-04-17 PROCEDURE — C1894 INTRO/SHEATH, NON-LASER: HCPCS | Performed by: INTERNAL MEDICINE

## 2025-04-17 RX ORDER — ACETAMINOPHEN 325 MG/1
650 TABLET ORAL EVERY 6 HOURS PRN
Status: DISCONTINUED | OUTPATIENT
Start: 2025-04-17 | End: 2025-04-17 | Stop reason: HOSPADM

## 2025-04-17 RX ORDER — LIDOCAINE HYDROCHLORIDE 10 MG/ML
INJECTION, SOLUTION EPIDURAL; INFILTRATION; INTRACAUDAL; PERINEURAL AS NEEDED
Status: DISCONTINUED | OUTPATIENT
Start: 2025-04-17 | End: 2025-04-17 | Stop reason: HOSPADM

## 2025-04-17 RX ORDER — HEPARIN SODIUM 1000 [USP'U]/ML
INJECTION, SOLUTION INTRAVENOUS; SUBCUTANEOUS AS NEEDED
Status: DISCONTINUED | OUTPATIENT
Start: 2025-04-17 | End: 2025-04-17 | Stop reason: HOSPADM

## 2025-04-17 RX ORDER — ACETAMINOPHEN 650 MG/1
650 SUPPOSITORY RECTAL EVERY 6 HOURS PRN
Status: DISCONTINUED | OUTPATIENT
Start: 2025-04-17 | End: 2025-04-17 | Stop reason: HOSPADM

## 2025-04-17 RX ORDER — FENTANYL CITRATE 50 UG/ML
INJECTION, SOLUTION INTRAMUSCULAR; INTRAVENOUS AS NEEDED
Status: DISCONTINUED | OUTPATIENT
Start: 2025-04-17 | End: 2025-04-17 | Stop reason: HOSPADM

## 2025-04-17 RX ORDER — IODIXANOL 320 MG/ML
INJECTION, SOLUTION INTRAVASCULAR AS NEEDED
Status: DISCONTINUED | OUTPATIENT
Start: 2025-04-17 | End: 2025-04-17 | Stop reason: HOSPADM

## 2025-04-17 RX ORDER — ACETAMINOPHEN 160 MG/5ML
650 SOLUTION ORAL EVERY 6 HOURS PRN
Status: DISCONTINUED | OUTPATIENT
Start: 2025-04-17 | End: 2025-04-17 | Stop reason: HOSPADM

## 2025-04-17 RX ORDER — MIDAZOLAM HYDROCHLORIDE 1 MG/ML
INJECTION, SOLUTION INTRAMUSCULAR; INTRAVENOUS AS NEEDED
Status: DISCONTINUED | OUTPATIENT
Start: 2025-04-17 | End: 2025-04-17 | Stop reason: HOSPADM

## 2025-04-17 ASSESSMENT — PAIN - FUNCTIONAL ASSESSMENT: PAIN_FUNCTIONAL_ASSESSMENT: 0-10

## 2025-04-17 ASSESSMENT — PAIN SCALES - GENERAL: PAINLEVEL_OUTOF10: 0 - NO PAIN

## 2025-04-17 NOTE — NURSING NOTE
END OF PROCEDURE MONITORING CRITERIA  01. BP is within +/- 20% of preprocedure  02. Oxygen sat is at 92% or above on RA, or existing order for O2 treatment, or at pre-sedation levels, otherwise new O2 order needed.  03. Unless the patient has a pre-procedure history of diminished level of consciousness, s/he is easily arousable and when aroused is able to responds appropriately for his/her age.  04. Significant complications related to the specific procedure are absent, have been controlled, or have been evaluated, including:      A. Pain.      B. Wound drainage.      C. All drains and tubes are patent.      D. Nausea and Vomiting. Vomiting is not persisten and has not occurred within 15 minutes prior to discharge.      E. Bladder distention. Voided bladder and/or no symptoms or urinary retention (e.g., bladder distention, frequent voiding in small amounts).      F. Neurovascular status.      G. Level of Consciousness consistent with pre procedural status.         Nephew affirmed that they were driving the patient home.

## 2025-04-17 NOTE — DISCHARGE INSTRUCTIONS
RESTART TAKING ALL YOUR MEDICATIONS TODAY, INCLUDING XARELTO.     CARDIAC CATHETERIZATION DISCHARGE INSTRUCTIONS     FOR SUDDEN AND SEVERE CHEST PAIN, SHORTNESS OF BREATH, EXCESSIVE BLEEDING, SIGNS OF STROKE, OR CHANGES IN MENTAL STATUS YOU SHOULD CALL 911 IMMEDIATELY.     If your provider has prescribed aspirin and/or clopidogrel (Plavix), or prasugrel (Effient), or ticagrelor (Brilinta), DO NOT STOP THESE MEDICATIONS for any reason without talking to your cardiologist first. If any of these were prescribed, you must take them every day without missing a single dose. If you are getting low on these medications, contact your provider immediately for a refill.     You received sedation today, please follow these guidelines:  FOR NEXT 24 HOURS  - Upon discharge, you should return home and rest for the remainder of the day and evening. You do not have to stay on bed rest but should not be very active.  It is recommended a responsible adult be with you for the first 24 hours after the procedure.    - No driving for 24 hours after procedure. Please arrange for someone to drive you home from the hospital today.     - Do not drive, operate machinery, or use power tools for 24 hours after your procedure.     - Do not make any legal decisions for 24 hours after your procedure.     - Do not drink alcoholic beverages for 24 hours after your procedure.    WOUND CARE   *FOR RADIAL (WRIST) ACCESS*  ·      No lifting more than 5 pounds or excessive use of the wrist for 24 hours - for example, treat your wrist as if it is sprained.  ·      Do not engage in vigorous activities (tennis, golf, bowling, weights) for at least 48 hours after the procedure.  ·      Do not submerge the wrist for 7 days after the procedure.  ·      You should expect mild tingling in your hand and tenderness at the puncture site for up to 3 days.    - The transparent dressing should be removed from the site 24 hours after the procedure.  Wash the site  gently with soap and water. Rinse well and pat dry. Keep the area clean and dry. You may apply a Band-Aid to the site. Avoid lotions, ointments, or powders until fully healed.     - You may shower the day after your procedure.      - It is normal to notice a small bruise around the puncture site and/or a small grape sized or smaller lump. Any large bruising or large lump warrants a call to the office.     - If bleeding should occur, lay down and apply pressure to the affected area for 10 minutes.  If the bleeding stops notify your physician.  If there is a large amount of bleeding or spurting of blood CALL 911 immediately.  DO NOT drive yourself to the hospital.    - You may experience some tenderness, bruising or minimal inflammation.  If you have any concerns, you may contact the Cath Lab or if any of these symptoms become excessive, contact your cardiologist or go to the emergency room.     OTHER INSTRUCTIONS  - You may take acetaminophen (Tylenol) as directed for discomfort.  If pain is not relieved with acetaminophen (Tylenol), contact your doctor.    - If you notice or experience any of the following, you should notify your doctor or seek medical attention  Chest pain or discomfort  Change in mental status or weakness in extremities.  Dizziness, light headedness, or feeling faint.  Change in the site where the procedure was performed, such as bleeding or an increased area of bruising or swelling.  Tingling, numbness, pain, or coolness in the leg/arm beyond the site where the procedure was performed.  Signs of infection (i.e. shaking chills, temperature > 100 degrees Fahrenheit, warmth, redness) in the leg/arm area where the procedure was performed.  Changes in urination   Bloody or black stools  Vomiting blood  Severe nose bleeds  Any excessive bleeding    - If you DO NOT have an appointment with your cardiologist within 2-3 weeks following your procedure, please contact their office.

## 2025-04-17 NOTE — Clinical Note
Closure device placed in the right radial artery. Site closed by radial compression system. 75mh @ 0488

## 2025-04-17 NOTE — NURSING NOTE
"Barb Ambriz in to see pt. Will contact him at later date for apt with Dr. Davy Newberry. Pt to contact dentist to address \"tooth infection\".    "

## 2025-04-24 ENCOUNTER — OFFICE VISIT (OUTPATIENT)
Dept: CARDIAC SURGERY | Facility: CLINIC | Age: 65
End: 2025-04-24
Payer: MEDICARE

## 2025-04-24 ENCOUNTER — TELEPHONE (OUTPATIENT)
Dept: CARDIOLOGY | Facility: HOSPITAL | Age: 65
End: 2025-04-24

## 2025-04-24 VITALS
SYSTOLIC BLOOD PRESSURE: 111 MMHG | RESPIRATION RATE: 18 BRPM | HEART RATE: 79 BPM | DIASTOLIC BLOOD PRESSURE: 73 MMHG | WEIGHT: 226 LBS | BODY MASS INDEX: 30.64 KG/M2

## 2025-04-24 DIAGNOSIS — I35.9 AORTIC VALVE DISEASE: Primary | ICD-10-CM

## 2025-04-24 DIAGNOSIS — I35.0 NONRHEUMATIC AORTIC VALVE STENOSIS: Primary | ICD-10-CM

## 2025-04-24 PROCEDURE — 1125F AMNT PAIN NOTED PAIN PRSNT: CPT | Performed by: THORACIC SURGERY (CARDIOTHORACIC VASCULAR SURGERY)

## 2025-04-24 PROCEDURE — 3074F SYST BP LT 130 MM HG: CPT | Performed by: THORACIC SURGERY (CARDIOTHORACIC VASCULAR SURGERY)

## 2025-04-24 PROCEDURE — 1036F TOBACCO NON-USER: CPT | Performed by: THORACIC SURGERY (CARDIOTHORACIC VASCULAR SURGERY)

## 2025-04-24 PROCEDURE — 3078F DIAST BP <80 MM HG: CPT | Performed by: THORACIC SURGERY (CARDIOTHORACIC VASCULAR SURGERY)

## 2025-04-24 PROCEDURE — 99215 OFFICE O/P EST HI 40 MIN: CPT | Performed by: THORACIC SURGERY (CARDIOTHORACIC VASCULAR SURGERY)

## 2025-04-24 PROCEDURE — 1159F MED LIST DOCD IN RCRD: CPT | Performed by: THORACIC SURGERY (CARDIOTHORACIC VASCULAR SURGERY)

## 2025-04-24 PROCEDURE — 99205 OFFICE O/P NEW HI 60 MIN: CPT | Performed by: THORACIC SURGERY (CARDIOTHORACIC VASCULAR SURGERY)

## 2025-04-24 ASSESSMENT — LIFESTYLE VARIABLES
HOW MANY STANDARD DRINKS CONTAINING ALCOHOL DO YOU HAVE ON A TYPICAL DAY: PATIENT DOES NOT DRINK
SKIP TO QUESTIONS 9-10: 1
HOW OFTEN DO YOU HAVE A DRINK CONTAINING ALCOHOL: NEVER
AUDIT-C TOTAL SCORE: 0
HOW OFTEN DO YOU HAVE SIX OR MORE DRINKS ON ONE OCCASION: NEVER

## 2025-04-24 ASSESSMENT — ENCOUNTER SYMPTOMS
DEPRESSION: 0
LOSS OF SENSATION IN FEET: 0
OCCASIONAL FEELINGS OF UNSTEADINESS: 0

## 2025-04-24 ASSESSMENT — PAIN SCALES - GENERAL
PAINLEVEL_OUTOF10: 10-WORST PAIN EVER
PAINLEVEL_OUTOF10: 0 - NO PAIN

## 2025-04-24 NOTE — PROGRESS NOTES
This is a 65 years old male patient with a very long history of peripheral vascular disease with many interventions with all kind of bypasses and stents many of those are already occluded.  The patient has significant history for long-term smoking and COPD.  He also has a history of significant coronary disease and now he is presented with a severe bicuspid aortic valve stenosis.  His aortic valve is definitely bicuspid Winter 0 with severe calcification.  I think the potential access for TAVR would be left carotid access.  He also might be a candidate for surgery as well.  We are going to request cardiology assessment from the structural valve with Dr. Laguerre.  He already has coronary angiogram which shows 90% of the RCA.

## 2025-04-24 NOTE — TELEPHONE ENCOUNTER
Received referral from Dr. Sheldon. Attempted to call home number. No answer. Tried mobile number , disconnected or no longer in service.

## 2025-04-28 ENCOUNTER — TELEPHONE (OUTPATIENT)
Dept: CARDIOLOGY | Facility: HOSPITAL | Age: 65
End: 2025-04-28
Payer: MEDICARE

## 2025-04-28 NOTE — TELEPHONE ENCOUNTER
Re-attempted to call. No answer on primary number. Tried alternate home number & marked disconnected. Called emergency contact 7 left message to have him have hi call office back.

## 2025-05-01 ENCOUNTER — TELEPHONE (OUTPATIENT)
Dept: CARDIOLOGY | Facility: HOSPITAL | Age: 65
End: 2025-05-01
Payer: MEDICARE

## 2025-05-01 ENCOUNTER — APPOINTMENT (OUTPATIENT)
Dept: CARDIOLOGY | Facility: CLINIC | Age: 65
End: 2025-05-01
Payer: MEDICARE

## 2025-05-01 DIAGNOSIS — I35.0 NONRHEUMATIC AORTIC VALVE STENOSIS: Primary | ICD-10-CM

## 2025-05-01 NOTE — TELEPHONE ENCOUNTER
Called again regarding referral to Dr. Jamison. Declined ability to come to [place] for appt. Scheduled telephone call NPV on 5-7-25 with Dr. Jamison. Will get updated lab work on 5-5-25.

## 2025-05-06 LAB
ALBUMIN SERPL-MCNC: 4.1 G/DL (ref 3.6–5.1)
BUN SERPL-MCNC: 18 MG/DL (ref 7–25)
BUN/CREAT SERPL: NORMAL (CALC) (ref 6–22)
CALCIUM SERPL-MCNC: 9.3 MG/DL (ref 8.6–10.3)
CHLORIDE SERPL-SCNC: 106 MMOL/L (ref 98–110)
CO2 SERPL-SCNC: 27 MMOL/L (ref 20–32)
CREAT SERPL-MCNC: 1.28 MG/DL (ref 0.7–1.35)
EGFRCR SERPLBLD CKD-EPI 2021: 61 ML/MIN/1.73M2
GLUCOSE SERPL-MCNC: 103 MG/DL (ref 65–139)
PHOSPHATE SERPL-MCNC: 3.8 MG/DL (ref 2.1–4.3)
POTASSIUM SERPL-SCNC: 4.3 MMOL/L (ref 3.5–5.3)
SODIUM SERPL-SCNC: 141 MMOL/L (ref 135–146)

## 2025-05-07 ENCOUNTER — TELEMEDICINE (OUTPATIENT)
Dept: CARDIOLOGY | Facility: HOSPITAL | Age: 65
End: 2025-05-07
Payer: MEDICARE

## 2025-05-07 ENCOUNTER — TELEPHONE (OUTPATIENT)
Dept: CARDIOLOGY | Facility: HOSPITAL | Age: 65
End: 2025-05-07
Payer: MEDICARE

## 2025-05-07 DIAGNOSIS — I35.0 NONRHEUMATIC AORTIC VALVE STENOSIS: ICD-10-CM

## 2025-05-07 DIAGNOSIS — I35.0 NONRHEUMATIC AORTIC VALVE STENOSIS: Primary | ICD-10-CM

## 2025-05-07 NOTE — PROGRESS NOTES
Virtual visit : Total time spent 61 minutes including chart preparation with 31 minutes spent face to face      PCP: Dr. Jones    HPI    65 y.o. y/o male with PMH of hypertension, Peripheral arterial disease with Bilateral atherosclerosis of legs, DVT, Hyperlipidemia, s/p colon cancer, presents for evaluation of severe, symptomatic aortic stenosis.  Patient relates history of worsening fatigue, SOB, CASH. Gradually doing less and less activity secondary to symptoms.      Denies overt orthopnea, PND, exertional CP.  Denies syncope or presyncope.  Denies increased abdominal girth, edema.    Full 14 point ROS complete and negative except as noted above.     TAVR Workup:   - Echo: EF 48%, mod-severe aortic stenosis, AV mean gradient 38, AV Vmax 4.12, RAVEN 0.77   - EKG:  Encounter Date: 04/17/25   ECG 12 Lead   Result Value    Ventricular Rate 61    Atrial Rate 61    UT Interval 188    QRS Duration 108    QT Interval 414    QTC Calculation(Bazett) 416    P Axis 43    R Axis 6    T Axis 24    QRS Count 10    Q Onset 216    P Onset 122    P Offset 176    T Offset 423    QTC Fredericia 416    Narrative    Normal sinus rhythm  Inferior infarct (cited on or before 25-FEB-2025)  Anterior infarct (cited on or before 25-FEB-2025)  T wave abnormality, consider lateral ischemia  Abnormal ECG  Confirmed by Adiel Rodríguez ([id number]) on 4/17/2025 4:00:55 PM   - NYHA: 2  - LHC: 04/17/2025- RCA disease  - CT TAVR: 05/08/2025  - dental clearance: will need appointment  - EFT 0/5  - STS   Procedure Type: Isolated AVR  Perioperative Outcome Estimate %  Operative Mortality 1.03%  Morbidity & Mortality 4.56%  Stroke 0.606%  Renal Failure 0.779%  Reoperation 2.42%  Prolonged Ventilation 2.07%  Deep Sternal Wound Infection 0.057%  Long Hospital Stay (>14 days) 1.96%  Short Hospital Stay (<6 days)* 64.8%      Social History     Tobacco Use    Smoking status: Former     Current packs/day: 0.00     Types: Cigarettes     Quit date: 12/6/2024     Years  "since quittin.4    Smokeless tobacco: Never   Substance Use Topics    Alcohol use: Never        Family History[1]     RX Allergies[2]     Current Outpatient Medications   Medication Instructions    albuterol 90 mcg/actuation inhaler 1-2 puffs, inhalation, Every 6 hours PRN    aspirin 81 mg, oral, Daily    atorvastatin (LIPITOR) 80 mg, oral, Daily    carvedilol (COREG) 25 mg, oral, 2 times daily (morning and late afternoon)    doxycycline (Monodox) 100 mg capsule Take 1 Capsule Twice daily. Take with at least 8 ounces (large glass) of water, do not lie down for 30 minutes after    FLUoxetine (PROzac) 20 mg tablet     furosemide (LASIX) 20 mg, oral, Daily    haloperidol decanoate (Haldol Decanoate) 100 mg/mL injection     OLANZapine (ZyPREXA) 15 mg tablet     polyethylene glycol (GLYCOLAX, MIRALAX) 17 g, Once    sacubitriL-valsartan (Entresto) 49-51 mg tablet 1 tablet, oral, Every 12 hours    Xarelto 20 mg, oral, Daily with evening meal        There were no vitals filed for this visit.     Physical Exam: virtual encounter      Lab Results   Component Value    CR 1.28     HGB 13.2     ALBUMIN 4.1    BNP 46           From chart review, Cardiac surgery:  \"- Very long history of peripheral vascular disease with many interventions with all kind of bypasses and stents many of those are already occluded. The patient has significant history for long-term smoking and COPD. He also has a history of significant coronary disease and now he is presented with a severe bicuspid aortic valve stenosis. His aortic valve is definitely bicuspid Winter 0 with severe calcification. I think the potential access for TAVR would be left carotid access. He also might be a candidate for surgery as well.\"    Impression:   65 y.o. y/o male with PMH of  Peripheral arterial disease with Bilateral atherosclerosis of legs, and bicuspid aortic valve, presents for evaluation of severe, symptomatic aortic stenosis.  We will review this " patient images during the heart team and discuss the access considering his previous history of severe vascular disease. We also need to assess his aortic valve considering approach about his valve disease in between SAVR x TAVR.    Plan:   W will need a Carotid doppler  We will get CT TAVR protocol     The overall decision regarding the best treatment for this condition is complex.  We discussed options of both surgical aortic valve replacement and transcatheter aortic valve replacement along with risks and benefits involved with both of them in detail.    We discussed all the risks associated with the procedure, including but not limited to stroke, MI, pericardial tamponade, vascular complications, infection and death were discussed with the patient. The risk of needing a permament pacemaker was also discussed in detail. The patient verbalized understanding and decided to proceed with the procedure.     We will discuss this patient's case at our Valve Team meeting with representatives from Structural Heart and Cardiac Surgery. Our nurse navigators will contact patient with further diagnostic needs and formal plan.            [1] No family history on file.  [2]   Allergies  Allergen Reactions    Clarithromycin Other, Swelling and Unknown

## 2025-05-08 ENCOUNTER — HOSPITAL ENCOUNTER (OUTPATIENT)
Dept: RADIOLOGY | Facility: HOSPITAL | Age: 65
Discharge: HOME | End: 2025-05-08
Payer: MEDICARE

## 2025-05-08 DIAGNOSIS — I35.0 NONRHEUMATIC AORTIC VALVE STENOSIS: ICD-10-CM

## 2025-05-08 PROCEDURE — 74174 CTA ABD&PLVS W/CONTRAST: CPT

## 2025-05-08 PROCEDURE — 2550000001 HC RX 255 CONTRASTS: Performed by: INTERNAL MEDICINE

## 2025-05-08 RX ADMIN — IOHEXOL 110 ML: 350 INJECTION, SOLUTION INTRAVENOUS at 13:07

## 2025-05-12 ENCOUNTER — OFFICE VISIT (OUTPATIENT)
Dept: CARDIOLOGY | Facility: CLINIC | Age: 65
End: 2025-05-12
Payer: MEDICARE

## 2025-05-12 ENCOUNTER — CARDIOLOGY CONFERENCE (OUTPATIENT)
Dept: CARDIOLOGY | Facility: HOSPITAL | Age: 65
End: 2025-05-12

## 2025-05-12 VITALS
BODY MASS INDEX: 30.1 KG/M2 | HEART RATE: 76 BPM | DIASTOLIC BLOOD PRESSURE: 84 MMHG | OXYGEN SATURATION: 97 % | SYSTOLIC BLOOD PRESSURE: 132 MMHG | WEIGHT: 222.2 LBS | HEIGHT: 72 IN

## 2025-05-12 DIAGNOSIS — Z95.5 PRESENCE OF CORONARY ANGIOPLASTY IMPLANT AND GRAFT: ICD-10-CM

## 2025-05-12 DIAGNOSIS — I35.0 NONRHEUMATIC AORTIC VALVE STENOSIS: ICD-10-CM

## 2025-05-12 DIAGNOSIS — I25.10 ARTERIOSCLEROSIS OF CORONARY ARTERY: Primary | ICD-10-CM

## 2025-05-12 DIAGNOSIS — I42.9 CARDIOMYOPATHY, UNSPECIFIED TYPE (MULTI): ICD-10-CM

## 2025-05-12 PROCEDURE — 3079F DIAST BP 80-89 MM HG: CPT | Performed by: INTERNAL MEDICINE

## 2025-05-12 PROCEDURE — 1126F AMNT PAIN NOTED NONE PRSNT: CPT | Performed by: INTERNAL MEDICINE

## 2025-05-12 PROCEDURE — 1159F MED LIST DOCD IN RCRD: CPT | Performed by: INTERNAL MEDICINE

## 2025-05-12 PROCEDURE — 99214 OFFICE O/P EST MOD 30 MIN: CPT | Performed by: INTERNAL MEDICINE

## 2025-05-12 PROCEDURE — G2211 COMPLEX E/M VISIT ADD ON: HCPCS | Performed by: INTERNAL MEDICINE

## 2025-05-12 PROCEDURE — 1160F RVW MEDS BY RX/DR IN RCRD: CPT | Performed by: INTERNAL MEDICINE

## 2025-05-12 PROCEDURE — 3008F BODY MASS INDEX DOCD: CPT | Performed by: INTERNAL MEDICINE

## 2025-05-12 PROCEDURE — 3075F SYST BP GE 130 - 139MM HG: CPT | Performed by: INTERNAL MEDICINE

## 2025-05-12 ASSESSMENT — ENCOUNTER SYMPTOMS
RESPIRATORY NEGATIVE: 1
GASTROINTESTINAL NEGATIVE: 1
CARDIOVASCULAR NEGATIVE: 1
MUSCULOSKELETAL NEGATIVE: 1
CONSTITUTIONAL NEGATIVE: 1

## 2025-05-12 ASSESSMENT — PAIN SCALES - GENERAL: PAINLEVEL_OUTOF10: 0-NO PAIN

## 2025-05-12 NOTE — PROGRESS NOTES
Chief Complaint: Aortic valve stenosis.  She has been  Mr Quesada is here in follow up for CAD, hyperlipidemia, ischemic CM, bicuspid AV with severe stenosis, hypertension.    History Of Present Illness:    .Mr Quesada returns in follow up.  Denies chest pain, palpitations or pedal edema.  Has increased sob and will have him see structural and pulmonary.  Quit smoking in 12/2024.       Last Recorded Vitals:  Blood pressure 138/85, pulse 74, height 1.829 m (6'), weight 101 kg (222 lb 3.2 oz), SpO2 97%.     Past Medical History:  History reviewed. No pertinent past medical history.     Past Surgical History:  Past Surgical History:   Procedure Laterality Date    CARDIAC CATHETERIZATION N/A 4/17/2025    Procedure: Left Heart Cath-Radial approach;  Surgeon: Adiel Rodrígeuz MD;  Location: [facility] Cardiac Cath Lab;  Service: Cardiovascular;  Laterality: N/A;  LEFT HEART CATH THURS APRIL 17TH, 2025 AT 8 AM PT WILL ARRIVE AT 7:00 AM [facility] DR[facility] KIM  [facility] AUTH# [id number] AUTH EXPIRES 6/30/2025 *Radial approach*    CT ABDOMEN PELVIS ANGIOGRAM W AND/OR WO IV CONTRAST  7/28/2015    CT ABDOMEN PELVIS ANGIOGRAM W AND/OR WO IV CONTRAST [facility]CILLARY LEGAC[facility]    CT ABDOMEN PELVIS ANGIOGRAM W AND/OR WO IV CONTRAST  11/7/2017    CT ABDOMEN PELVIS ANGIOGRAM W AND/OR WO IV CONTRAST [facility] INPATIENT LEGACY    CT ABDOMEN PELVIS ANGIOGRAM W AND/OR WO IV CONTRAST  3/24/2022    CT ABDOMEN PELVIS ANGIOGRAM W AND/OR WO IV CONTRAST [facility]GAC[facility]    CT ANGIO AORTA AND BILATERAL ILIOFEMORAL RUN OFF INCLUDING WITHOUT CONTRAST IF PERFORMED  4/4/2022    CT AORTA AND BILATERAL ILIOFEMORAL RUNOFF ANGIOGRAM W AND/OR WO IV CONTRAST 4/4/2022 [facility] ANCILLARY LEGACY    CT ANGIO AORTA AND BILATERAL ILIOFEMORAL RUN OFF INCLUDING WITHOUT CONTRAST IF PERFORMED  9/12/2013    CT AORTA AND BILATERAL ILIOFEMORAL RUNOFF ANGIOGRAM W AND/OR WO IV CONTRAST [facility]GAC[facility]    CT ANGIO AORTA AND BILATERAL ILIOFEMORAL RUN OFF  INCLUDING WITHOUT CONTRAST IF PERFORMED  10/29/2013    CT AORTA AND BILATERAL ILIOFEMORAL RUNOFF ANGIOGRAM W AND/OR WO IV CONTRAST [facility] CLINICAL LEGACY    CT ANGIO AORTA AND BILATERAL ILIOFEMORAL RUN OFF INCLUDING WITHOUT CONTRAST IF PERFORMED  10/5/2016    CT AORTA AND BILATERAL ILIOFEMORAL RUNOFF ANGIOGRAM W AND/OR WO IV CONTRAST [facility] ANCILLARY LEGACY    CT ANGIO AORTA AND BILATERAL ILIOFEMORAL RUN OFF INCLUDING WITHOUT CONTRAST IF PERFORMED  2016    CT AORTA AND BILATERAL ILIOFEMORAL RUNOFF ANGIOGRAM W AND/OR WO IV CONTRAST [facility] INPATIENT LEGACY    CT PELVIS ANGIO W AND WO IV CONTRAST  2018    CT PELVIS ANGIO W AND WO IV CONTRAST [facility] EMERGENCY LEGACY    IR ANGIOGRAM LOWER EXTREMITY BILATERAL Bilateral 2015    IR ANGIOGRAM LOWER EXTREMITY BILATERAL [facility] INPATIENT LEGACY    IR ANGIOGRAM LOWER EXTREMITY LEFT Left 10/28/2015    IR ANGIOGRAM LOWER EXTREMITY LEFT [facility] INPATIENT LEGACY    IR ANGIOGRAM LOWER EXTREMITY LEFT Left 11/10/2017    IR ANGIOGRAM LOWER EXTREMITY LEFT [facility] INPATIENT LEGACY    IR ANGIOGRAM LOWER EXTREMITY LEFT Left 5/3/2019    IR ANGIOGRAM LOWER EXTREMITY LEFT [facility] INPATIENT LEGACY    MR ANGIO ABDOMEN W AND WO IV CONTRAST  2022    MR ABDOMEN ANGIO W AND WO [facility]    OTHER SURGICAL HISTORY  2017    Angioplasty    OTHER SURGICAL HISTORY  2017    Creation Of Pericardial Window    US GUIDED ABSCESS DRAIN  2022    US GUIDED [facility]ESS DRAIN [facility]       Social History:  Social History     Socioeconomic History    Marital status: Single   Tobacco Use    Smoking status: Former     Current packs/day: 0.00     Types: Cigarettes     Quit date: 2024     Years since quittin.4    Smokeless tobacco: Never   Substance and Sexual Activity    Alcohol use: Never    Drug use: Never       Family History:  No family history on file.      Allergies:  Clarithromycin    Outpatient Medications:  Current Outpatient Medications   Medication Sig  Dispense Refill    aspirin 81 mg EC tablet Take 1 tablet (81 mg) by mouth once daily. 90 tablet 3    atorvastatin (Lipitor) 80 mg tablet Take 1 tablet (80 mg) by mouth once daily. 90 tablet 3    carvedilol (Coreg) 25 mg tablet Take 1 tablet (25 mg) by mouth 2 times daily (morning and late afternoon). 180 tablet 3    doxycycline (Monodox) 100 mg capsule Take 1 Capsule Twice daily. Take with at least 8 ounces (large glass) of water, do not lie down for 30 minutes after 60 capsule 11    FLUoxetine (PROzac) 20 mg tablet       furosemide (Lasix) 20 mg tablet Take 1 tablet (20 mg) by mouth once daily. 90 tablet 3    haloperidol decanoate (Haldol Decanoate) 100 mg/mL injection       OLANZapine (ZyPREXA) 15 mg tablet       polyethylene glycol (Glycolax, Miralax) 17 gram/dose powder Mix 17 g of powder and drink 1 time.      sacubitriL-valsartan (Entresto) 49-51 mg tablet Take 1 tablet by mouth every 12 hours. 180 tablet 3    Xarelto 20 mg tablet Take 1 tablet (20 mg) by mouth once daily in the evening. Take with meals. 90 tablet 3    albuterol 90 mcg/actuation inhaler Inhale 1-2 puffs every 6 hours if needed for wheezing. 18 g 0     No current facility-administered medications for this visit.        Physical Exam:  Cardiovascular:      PMI at left midclavicular line. Normal rate. Regular rhythm. Normal S1. Normal S2.       Murmurs: There is no murmur.      No gallop.  No click. No rub.   Pulses:     Intact distal pulses.   Edema:     Peripheral edema absent.       ROS:  Review of Systems   Constitutional: Negative.   Cardiovascular: Negative.    Respiratory: Negative.     Skin: Negative.    Musculoskeletal: Negative.    Gastrointestinal: Negative.    Genitourinary: Negative.           Last Labs:  Reviewed at [facility] 03/24/2025.  CBC -  Lab Results   Component Value Date    WBC 6.6 04/14/2025    HGB 13.2 04/14/2025    HCT 40.3 04/14/2025    MCV 90.2 04/14/2025     04/14/2025       CMP -  Lab Results   Component Value Date     CALCIUM 9.3 05/05/2025    PHOS 3.8 05/05/2025    PROT 6.7 02/25/2025    ALBUMIN 4.1 05/05/2025    AST 16 02/25/2025    ALT 19 02/25/2025    ALKPHOS 140 (H) 02/25/2025    BILITOT 0.5 02/25/2025       LIPID PANEL -   Lab Results   Component Value Date    CHOL 107 05/08/2023    TRIG 118 05/08/2023    HDL 29.0 (A) 05/08/2023    CHHDL 3.7 05/08/2023    LDLF 54 05/08/2023    VLDL 24 05/08/2023    NHDL 120 07/23/2018       RENAL FUNCTION PANEL -   Lab Results   Component Value Date    GLUCOSE 103 05/05/2025     05/05/2025    K 4.3 05/05/2025     05/05/2025    CO2 27 05/05/2025    ANIONGAP 12 04/14/2025    BUN 18 05/05/2025    CREATININE 1.28 05/05/2025    GFRMALE 61 05/08/2023    CALCIUM 9.3 05/05/2025    PHOS 3.8 05/05/2025    ALBUMIN 4.1 05/05/2025        Lab Results   Component Value Date    BNP 46 02/25/2025    HGBA1C 5.3 05/08/2023         Assessment/Plan   Problem List Items Addressed This Visit    None    1. CAD. PTCA and bare metal stent to LAD 08/2007 at [facility], Dr Dorsey. Patient did have a screening outpatient IV pharmacological nuclear stress test performed on 12/21/2021 which demonstrated a moderate to large defect that was fixed consistent with scar involving the apex and inferior wall with nontransmural fixed septal defect again consistent with scarring. The lateral wall appeared to be normal and there was no stress-induced reversible myocardial ischemia. The patient denies any anginal type chest discomfort. Prior to the stress test the patient also had an echocardiogram performed on 10/25/2021 and demonstrating moderate hypokinesis of the mid and distal anteroseptal wall with an LV ejection fraction of 45%. He was found to have a bicuspid aortic valve with a mild degree of stenosis peak systolic pressure gradient of 28 mmHg. There was mild dilatation of the aortic root measuring 3.9 cm. 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  Patient is now experiencing more sob and will be referred to structural heart to assess his aortic valve and may need possible heart cath.     Echo 02/2025  CONCLUSIONS:   1. The left ventricular systolic function is mildly decreased, with a visually estimated ejection fraction of 45-50%.   2. Abnormal left venticular wall motion.   3. Spectral Doppler shows an abnormal pattern of left ventricular diastolic filling.   4. There is moderate hypokinesis of the mid and distal anteroseptal wall.   5. The aortic valve appears bicuspid with two aortic sinuses in a laterolateral orientation.   6. Moderate to severe aortic valve stenosis.   7. Upper limits of normal size aortic root.   8. The peak instantaneous gradient of the aortic valve is 68 mmHg.     2. Peripheral vascular disease with left femoropopliteal PTFE bypass/stenting of left superficial femoral artery. 11/2017 at [facility] with Dr Arellano. CT angio 09/2018 showed a well patent aortobifemoral bypass graft extending from the infrarenal abdominal aorta down to proximal superficial femoral arteries bilaterally. 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 On asa 81 mg daily. Will continue Xarelto 20 mg daily.   Follows with vascular, Jose David Farmer.     3. Hyperlipidemia. Last FLP done 07/2018 showed chol 146, HDL 26, LDL 76, trig 220. Continue atorvastatin 80 mg daily. Had labwork 08/2020. FLP done 05/2023 showed chol 107, HDL 29, LDL 54, trig 118.      4. Ischemic cardiomyopathy/Bicuspid AV. Last echo showed LV mildly dilated, EF 50--55%, mild AV stenosis. Continue carvedilol and entresto as well as furosemide. Echo done 10/25/2021 showed EF 45%, mild AI, with gradient 28 mm Hg, moderate hypokinesis of the mid and distal anteroseptal wall, mild diastolic dysfunction, possible bicuspid AV . Plan to repeat  echo every two years. The patient's dose of Entresto will be increased from 49/51 mg twice daily to 97/103 mg twice daily. Continue carvedilol 12.5 mg twice daily unchanged along with the Lasix 20 mg daily. Echo done 09/2023 showed EF 55%, mild hypokinesis of the mid to distal half of the anteroseptal wall, bicuspid AV, moderate AS with 53 mm Hg gradient, 4.0 cm aortic dilation, evidence of diastolic dysfunction. Will likely need a new valve in the next 5 years. Will monitor echoes every 15-18 months.  The patient was seen at the [facility] emergency room on 5/10/2024 with cough bilateral chest pain intermittent fever and headache.  Chest x-ray showed some mild interstitial edema trace pleural effusions.  Abdominal CT demonstrated trace bilateral pleural effusions mild interstitial edema cortical scarring of the kidneys bilaterally pancreatic cyst 4.2 x 2.2 cm in diameter.  CBC included hematocrit 38.2.  SMA panel creatinine 1.40 glucose 139.  Nasal swab was negative.  Patient did have some increased nasal drainage.  The patient's echocardiogram 9/11/2023 showed an LV ejection fraction of 50-55% moderate LVH moderate hypokinesis of basal and mid anteroseptal wall moderate to severe aortic valve stenosis peak systolic pressure gradient of 53 mmHg.  Patient has some mild lingering shortness of breath and will begin low-dose Lasix 20 mg daily.  Is on carvedilol 25 mg twice daily and Entresto now 49-51 mg twice daily with his low dose furosemide.  The patient had a repeat echocardiogram 2/28/2025 demonstrating an LV ejection fraction at 45 to 50% with moderate hypokinesis of the mid and distal anteroseptal wall.  The aortic valve appeared to be bicuspid with 2 aortic sinuses and there was moderate to severe aortic valve stenosis with a peak systolic pressure gradient of 68 mmHg.  The patient subsequently underwent cardiac catheterization 4/17/2025 which demonstrated nonobstructive disease within the LAD and LCx.   The dominant RCA had a 90% mid vessel stenosis.  There was a mean gradient across the aortic valve of 49 mmHg consistent with severe aortic valve stenosis.  Patient was referred to structural heart team and was evaluated by interventional cardiology and cardiothoracic surgery.  Ultimate recommendation has been for an open surgical aortic valve replacement with CABG to the RCA and agree that this is the best option given his extensive peripheral vascular disease.  Patient is back on usual Xarelto.  Patient requires 8 upper to lower teeth to be extracted in the near future in preparation for the valve replacement.  He will hold his Xarelto in preparation for the dental extractions.  He will be seen presumably 2 to 3 weeks postoperatively.     5. Schizophrenia. On meds.     6. Hypertension. Well controlled today with bp 124/80.     7. Quit smoking, 12/06/2024.     8. Hx of covid-19 05/2021 and both vaccines.     9.? Left inguinal hernia. Patient developed pain in a slight area of swelling in the left inguinal region after throwing some material into a dumpster. He was referred to general surgery to assess for possible hernia.     10. AAA repair with graft infection. Patient had aortic endograft s/p exploration, aortic reconstruction and left popliteal obturator bypass with right profunda bypass with SFA graft 04/2022. He was seen for antibiotic treatment, but left AMA. Apparently did see ID for antibiotic treatment as outpatient.  The patient currently does have follow-up with vascular surgery.     11.  Aortic valve stenosis.  The patient's echocardiogram on 9/11/2023 in part demonstrated moderately severe aortic valve stenosis with a peak systolic pressure gradient of 53 mmHg.  He does have history of ischemic heart disease with some regional wall motion abnormalities with the echo also demonstrating moderate LVH moderate hypokinesis of basal mid anteroseptal wall and an LV ejection fraction of 50-55%.  Patient had  repeat echocardiogram performed in 11/2024 and 02/2025 and is referred to structural heart.     12.  History of renal stones.  The patient went to the [facility] emergency room on 8/11/2024 with hematuria.  He was subsequently seen by urology and in the process of being assessed at the office was instructed to urinate at which time he did pass the stone.  Subsequent analysis of the stone is notable for calcium oxalate consistency.  The patient had actually started Xarelto anticoagulation 1 week before he developed the hematuria.  His urine is now clear and he has been able to restart the Xarelto.  Lab work from 8/11/2024 included a normal CBC, creatinine of 1.3, negative urine culture.

## 2025-05-13 ENCOUNTER — TELEPHONE (OUTPATIENT)
Dept: CARDIOLOGY | Facility: HOSPITAL | Age: 65
End: 2025-05-13
Payer: MEDICARE

## 2025-05-13 NOTE — TELEPHONE ENCOUNTER
Attempted to call. No answer. Per Structural Heart team meeting , better surgical treatment for aortic stenosis. Need to discuss with Dr. Sheldon. Message notification sent to Dr. Sheldon's office to follow up.

## 2025-05-15 ENCOUNTER — APPOINTMENT (OUTPATIENT)
Dept: RADIOLOGY | Facility: HOSPITAL | Age: 65
End: 2025-05-15
Payer: MEDICARE

## 2025-05-16 ENCOUNTER — APPOINTMENT (OUTPATIENT)
Dept: CARDIOLOGY | Facility: HOSPITAL | Age: 65
End: 2025-05-16
Payer: MEDICARE

## 2025-05-16 ENCOUNTER — APPOINTMENT (OUTPATIENT)
Dept: RADIOLOGY | Facility: HOSPITAL | Age: 65
End: 2025-05-16
Payer: MEDICARE

## 2025-05-16 ENCOUNTER — HOSPITAL ENCOUNTER (EMERGENCY)
Facility: HOSPITAL | Age: 65
Discharge: HOME | End: 2025-05-16
Payer: MEDICARE

## 2025-05-16 VITALS
RESPIRATION RATE: 16 BRPM | WEIGHT: 223.99 LBS | OXYGEN SATURATION: 96 % | TEMPERATURE: 98.4 F | HEART RATE: 64 BPM | DIASTOLIC BLOOD PRESSURE: 95 MMHG | BODY MASS INDEX: 30.34 KG/M2 | SYSTOLIC BLOOD PRESSURE: 131 MMHG | HEIGHT: 72 IN

## 2025-05-16 DIAGNOSIS — R53.1 GENERALIZED WEAKNESS: Primary | ICD-10-CM

## 2025-05-16 LAB
ALBUMIN SERPL BCP-MCNC: 4 G/DL (ref 3.4–5)
ALP SERPL-CCNC: 121 U/L (ref 33–136)
ALT SERPL W P-5'-P-CCNC: 15 U/L (ref 10–52)
ANION GAP SERPL CALCULATED.3IONS-SCNC: 10 MMOL/L (ref 10–20)
APPEARANCE UR: CLEAR
AST SERPL W P-5'-P-CCNC: 14 U/L (ref 9–39)
BASOPHILS # BLD AUTO: 0.07 X10*3/UL (ref 0–0.1)
BASOPHILS NFR BLD AUTO: 0.8 %
BILIRUB SERPL-MCNC: 0.6 MG/DL (ref 0–1.2)
BILIRUB UR STRIP.AUTO-MCNC: NEGATIVE MG/DL
BNP SERPL-MCNC: 79 PG/ML (ref 0–99)
BUN SERPL-MCNC: 14 MG/DL (ref 6–23)
CALCIUM SERPL-MCNC: 9.3 MG/DL (ref 8.6–10.3)
CARDIAC TROPONIN I PNL SERPL HS: 11 NG/L (ref 0–20)
CARDIAC TROPONIN I PNL SERPL HS: 12 NG/L (ref 0–20)
CHLORIDE SERPL-SCNC: 106 MMOL/L (ref 98–107)
CO2 SERPL-SCNC: 27 MMOL/L (ref 21–32)
COLOR UR: NORMAL
CREAT SERPL-MCNC: 1.33 MG/DL (ref 0.5–1.3)
EGFRCR SERPLBLD CKD-EPI 2021: 59 ML/MIN/1.73M*2
EOSINOPHIL # BLD AUTO: 0.43 X10*3/UL (ref 0–0.7)
EOSINOPHIL NFR BLD AUTO: 5.1 %
ERYTHROCYTE [DISTWIDTH] IN BLOOD BY AUTOMATED COUNT: 15 % (ref 11.5–14.5)
FLUAV RNA RESP QL NAA+PROBE: NOT DETECTED
FLUBV RNA RESP QL NAA+PROBE: NOT DETECTED
GLUCOSE SERPL-MCNC: 97 MG/DL (ref 74–99)
GLUCOSE UR STRIP.AUTO-MCNC: NORMAL MG/DL
HCT VFR BLD AUTO: 41 % (ref 41–52)
HGB BLD-MCNC: 13.5 G/DL (ref 13.5–17.5)
IMM GRANULOCYTES # BLD AUTO: 0.08 X10*3/UL (ref 0–0.7)
IMM GRANULOCYTES NFR BLD AUTO: 1 % (ref 0–0.9)
KETONES UR STRIP.AUTO-MCNC: NEGATIVE MG/DL
LEUKOCYTE ESTERASE UR QL STRIP.AUTO: NEGATIVE
LYMPHOCYTES # BLD AUTO: 0.79 X10*3/UL (ref 1.2–4.8)
LYMPHOCYTES NFR BLD AUTO: 9.4 %
MAGNESIUM SERPL-MCNC: 2.07 MG/DL (ref 1.6–2.4)
MCH RBC QN AUTO: 29.3 PG (ref 26–34)
MCHC RBC AUTO-ENTMCNC: 32.9 G/DL (ref 32–36)
MCV RBC AUTO: 89 FL (ref 80–100)
MONOCYTES # BLD AUTO: 0.85 X10*3/UL (ref 0.1–1)
MONOCYTES NFR BLD AUTO: 10.1 %
NEUTROPHILS # BLD AUTO: 6.19 X10*3/UL (ref 1.2–7.7)
NEUTROPHILS NFR BLD AUTO: 73.6 %
NITRITE UR QL STRIP.AUTO: NEGATIVE
NRBC BLD-RTO: 0 /100 WBCS (ref 0–0)
PH UR STRIP.AUTO: 6 [PH]
PLATELET # BLD AUTO: 268 X10*3/UL (ref 150–450)
POTASSIUM SERPL-SCNC: 3.9 MMOL/L (ref 3.5–5.3)
PROT SERPL-MCNC: 6.6 G/DL (ref 6.4–8.2)
PROT UR STRIP.AUTO-MCNC: NEGATIVE MG/DL
RBC # BLD AUTO: 4.61 X10*6/UL (ref 4.5–5.9)
RBC # UR STRIP.AUTO: NEGATIVE MG/DL
RSV RNA RESP QL NAA+PROBE: NOT DETECTED
SARS-COV-2 RNA RESP QL NAA+PROBE: NOT DETECTED
SODIUM SERPL-SCNC: 139 MMOL/L (ref 136–145)
SP GR UR STRIP.AUTO: 1.02
UROBILINOGEN UR STRIP.AUTO-MCNC: NORMAL MG/DL
WBC # BLD AUTO: 8.4 X10*3/UL (ref 4.4–11.3)

## 2025-05-16 PROCEDURE — 80053 COMPREHEN METABOLIC PANEL: CPT

## 2025-05-16 PROCEDURE — 84484 ASSAY OF TROPONIN QUANT: CPT

## 2025-05-16 PROCEDURE — 36415 COLL VENOUS BLD VENIPUNCTURE: CPT

## 2025-05-16 PROCEDURE — 71045 X-RAY EXAM CHEST 1 VIEW: CPT | Performed by: RADIOLOGY

## 2025-05-16 PROCEDURE — 70450 CT HEAD/BRAIN W/O DYE: CPT | Performed by: RADIOLOGY

## 2025-05-16 PROCEDURE — 2550000001 HC RX 255 CONTRASTS

## 2025-05-16 PROCEDURE — 85025 COMPLETE CBC W/AUTO DIFF WBC: CPT

## 2025-05-16 PROCEDURE — 99285 EMERGENCY DEPT VISIT HI MDM: CPT | Mod: 25

## 2025-05-16 PROCEDURE — 70450 CT HEAD/BRAIN W/O DYE: CPT

## 2025-05-16 PROCEDURE — 83735 ASSAY OF MAGNESIUM: CPT

## 2025-05-16 PROCEDURE — 70491 CT SOFT TISSUE NECK W/DYE: CPT

## 2025-05-16 PROCEDURE — 81003 URINALYSIS AUTO W/O SCOPE: CPT

## 2025-05-16 PROCEDURE — 83880 ASSAY OF NATRIURETIC PEPTIDE: CPT

## 2025-05-16 PROCEDURE — 87637 SARSCOV2&INF A&B&RSV AMP PRB: CPT

## 2025-05-16 PROCEDURE — 93005 ELECTROCARDIOGRAM TRACING: CPT

## 2025-05-16 PROCEDURE — 70491 CT SOFT TISSUE NECK W/DYE: CPT | Performed by: RADIOLOGY

## 2025-05-16 PROCEDURE — 71045 X-RAY EXAM CHEST 1 VIEW: CPT

## 2025-05-16 RX ADMIN — IOHEXOL 75 ML: 350 INJECTION, SOLUTION INTRAVENOUS at 11:53

## 2025-05-16 ASSESSMENT — PAIN SCALES - GENERAL
PAINLEVEL_OUTOF10: 0 - NO PAIN
PAINLEVEL_OUTOF10: 0 - NO PAIN

## 2025-05-16 ASSESSMENT — COLUMBIA-SUICIDE SEVERITY RATING SCALE - C-SSRS
1. IN THE PAST MONTH, HAVE YOU WISHED YOU WERE DEAD OR WISHED YOU COULD GO TO SLEEP AND NOT WAKE UP?: NO
6. HAVE YOU EVER DONE ANYTHING, STARTED TO DO ANYTHING, OR PREPARED TO DO ANYTHING TO END YOUR LIFE?: NO
2. HAVE YOU ACTUALLY HAD ANY THOUGHTS OF KILLING YOURSELF?: NO

## 2025-05-16 ASSESSMENT — PAIN - FUNCTIONAL ASSESSMENT: PAIN_FUNCTIONAL_ASSESSMENT: 0-10

## 2025-05-16 NOTE — DISCHARGE INSTRUCTIONS
Thank you for visiting [facility] emergency department.  It was a pleasure caring for you.    Be sure to take all medications, over the counter medications or prescription medications only as directed.     Be sure to follow up as directed in 1-2 days.  All of the details of your follow up instructions are detailed in the follow up section of this packet.    It is important to remember that your care does not end here and you must continue to monitor your condition closely. Please return to the emergency department for any worsening or concerning signs or symptoms as directed by our conversations and the discharge instructions. Otherwise please follow up with your doctor in 2 days if no better or worse. If you do not have a doctor please contact the referral number on your discharge instructions. Please contact any physician specialists provided in your discharge notes as it is very important to follow up with them regarding your condition. If you are unable to reach the physicians provided, please come back to the Emergency Department at any time.     As always, please take medications as directed. If you have any questions at all regarding your medications, please contact the pharmacist, the emergency department, or your doctor. Before taking any medication prescribed in the Emergency Department, please review the medication side effects and drug interactions (<http://www.rxlist.com/script/main/hp.asp>) as they may interact with your home medications.     Having trouble affording medications? Try [employer] <[email or web address]/>! (This is not a hospital endorsed website, merely a recommendation based on my own personal experiences with [employer])      Return to emergency room without delay for ANY new or worsening pains or for any other symptoms or concerns.      Return with worsening pains, nausea, vomiting, trouble breathing, palpitations, shortness of breath, inability to pass stool or urine, loss of control of stool  or urine, any numbness or tingling (that is not normal for you), uncontrolled fevers, the passing of blood or other material in stool or urine, rashes, pains or for any other symptoms or concerns you may have.  You are always welcome to return to the ER at any time for any reason or for any other concerns you may have.

## 2025-05-16 NOTE — ED PROVIDER NOTES
THIS IS MY MARIBELL SUPERVISORY AND SHARED VISIT NOTE:    I personally saw the patient and made/approved the management plan and take responsibility for the patient management.    History: Patient is a 65-year-old male presenting with a chief complaint of speech problem.  Patient states he has had trouble speaking for several months now, he states it comes and goes, he states he is wondering if has to do with his fraction, as well as his restarting cigarette smoking.  Patient states he discontinued cigarette smoking for about 2 months, however yesterday he did restart, smoked 2 packs in the last day, patient has no headache, dizziness or blurred vision, no prior CVA, does have extensive cardiac history, is due to have procedures performed within the next year, patient has no other acute complaints at this time    Exam: GENERAL APPEARANCE: Awake and alert.     HEENT: Normocephalic, atraumatic. Extraocular muscles are intact. Pupils equal round and reactive to light.  CHEST: Nontender to palpation. Clear to auscultation bilaterally. No rales, rhonchi, or wheezing.   HEART: S1, S2. Regular rate and rhythm. No murmurs, gallops or rubs.  Strong and equal pulses in the extremities.   ABDOMEN: Soft,.  non-tender.  No rebound or guarding, bowel sounds normal x 4 quadrants  NEUROLOGICAL: Awake, alert and oriented x 3.  Cranial nerves II through XII grossly intact      MDM: Patient seen and evaluated at bedside, patient is in no acute distress.  I will order a CBC, CMP, magnesium, BNP, troponin, COVID, flu, RSV, urinalysis, CT head, x-ray chest, CT soft tissue neck, EKG,. Differential diagnosis includes but is not limited to slurred speech, generalized weakness, electrolyte abnormality, CVA, malignancy  Patient CBC shows hemoglobin 13.5, and a crit of 41.0, CMP shows creatinine 1.33, patient's troponin 12 and 11, COVID flu and RSV negative, urinalysis does not show signs of infection, imaging results as below, patient will be  discharged, advised to follow-up with his primary care provider, and given referral to neurology.    Diagnosis: Generalized weakness  CT head wo IV contrast   Final Result   No evidence of acute cortical infarct or intracranial hemorrhage.        Chronic changes as described.        MACRO:   None        Signed by: Ema Mcmahon 5/16/2025 12:08 PM   Dictation workstation:   [id number]      CT soft tissue neck w IV contrast   Final Result   No evidence of bulky or otherwise clearly pathologic cervical   adenopathy or a soft tissue mass in the neck.        Signed by: Ger Asencio 5/16/2025 12:07 PM   Dictation workstation:   [id number]      XR chest 1 view   Final Result   1.  No evidence of acute cardiopulmonary process.                  MACRO:   None.        Signed by: Jhonathan Alfredo 5/16/2025 11:16 AM   Dictation workstation:   [id number]        Results for orders placed or performed during the hospital encounter of 05/16/25   ECG 12 lead    Collection Time: 05/16/25 10:43 AM   Result Value Ref Range    Ventricular Rate 67 BPM    Atrial Rate 67 BPM    CO Interval 196 ms    QRS Duration 110 ms    QT Interval 396 ms    QTC Calculation(Bazett) 418 ms    P Axis 17 degrees    R Axis 7 degrees    T Axis 10 degrees    QRS Count 11 beats    Q Onset 215 ms    P Onset 117 ms    P Offset 181 ms    T Offset 413 ms    QTC Fredericia 411 ms   CBC and Auto Differential    Collection Time: 05/16/25 11:03 AM   Result Value Ref Range    WBC 8.4 4.4 - 11.3 x10*3/uL    nRBC 0.0 0.0 - 0.0 /100 WBCs    RBC 4.61 4.50 - 5.90 x10*6/uL    Hemoglobin 13.5 13.5 - 17.5 g/dL    Hematocrit 41.0 41.0 - 52.0 %    MCV 89 80 - 100 fL    MCH 29.3 26.0 - 34.0 pg    MCHC 32.9 32.0 - 36.0 g/dL    RDW 15.0 (H) 11.5 - 14.5 %    Platelets 268 150 - 450 x10*3/uL    Neutrophils % 73.6 40.0 - 80.0 %    Immature Granulocytes %, Automated 1.0 (H) 0.0 - 0.9 %    Lymphocytes % 9.4 13.0 - 44.0 %    Monocytes % 10.1 2.0 - 10.0 %    Eosinophils % 5.1 0.0 - 6.0 %     Basophils % 0.8 0.0 - 2.0 %    Neutrophils Absolute 6.19 1.20 - 7.70 x10*3/uL    Immature Granulocytes Absolute, Automated 0.08 0.00 - 0.70 x10*3/uL    Lymphocytes Absolute 0.79 (L) 1.20 - 4.80 x10*3/uL    Monocytes Absolute 0.85 0.10 - 1.00 x10*3/uL    Eosinophils Absolute 0.43 0.00 - 0.70 x10*3/uL    Basophils Absolute 0.07 0.00 - 0.10 x10*3/uL   Magnesium    Collection Time: 05/16/25 11:03 AM   Result Value Ref Range    Magnesium 2.07 1.60 - 2.40 mg/dL   Comprehensive metabolic panel    Collection Time: 05/16/25 11:03 AM   Result Value Ref Range    Glucose 97 74 - 99 mg/dL    Sodium 139 136 - 145 mmol/L    Potassium 3.9 3.5 - 5.3 mmol/L    Chloride 106 98 - 107 mmol/L    Bicarbonate 27 21 - 32 mmol/L    Anion Gap 10 10 - 20 mmol/L    Urea Nitrogen 14 6 - 23 mg/dL    Creatinine 1.33 (H) 0.50 - 1.30 mg/dL    eGFR 59 (L) >60 mL/min/1.73m*2    Calcium 9.3 8.6 - 10.3 mg/dL    Albumin 4.0 3.4 - 5.0 g/dL    Alkaline Phosphatase 121 33 - 136 U/L    Total Protein 6.6 6.4 - 8.2 g/dL    AST 14 9 - 39 U/L    Bilirubin, Total 0.6 0.0 - 1.2 mg/dL    ALT 15 10 - 52 U/L   B-Type Natriuretic Peptide    Collection Time: 05/16/25 11:03 AM   Result Value Ref Range    BNP 79 0 - 99 pg/mL   Troponin I, High Sensitivity, Initial    Collection Time: 05/16/25 11:03 AM   Result Value Ref Range    Troponin I, High Sensitivity 12 0 - 20 ng/L   Sars-CoV-2, Influenza A/B and RSV PCR    Collection Time: 05/16/25 11:04 AM   Result Value Ref Range    Coronavirus 2019, PCR Not Detected Not Detected    Flu A Result Not Detected Not Detected    Flu B Result Not Detected Not Detected    RSV PCR Not Detected Not Detected   Troponin, High Sensitivity, 1 Hour    Collection Time: 05/16/25 12:07 PM   Result Value Ref Range    Troponin I, High Sensitivity 11 0 - 20 ng/L   Urinalysis with Reflex Culture and Microscopic    Collection Time: 05/16/25 12:10 PM   Result Value Ref Range    Color, Urine Light-Yellow Light-Yellow, Yellow, Dark-Yellow     Appearance, Urine Clear Clear    Specific Gravity, Urine 1.021 1.005 - 1.035    pH, Urine 6.0 5.0, 5.5, 6.0, 6.5, 7.0, 7.5, 8.0    Protein, Urine NEGATIVE NEGATIVE, 10 (TRACE), 20 (TRACE) mg/dL    Glucose, Urine Normal Normal mg/dL    Blood, Urine NEGATIVE NEGATIVE mg/dL    Ketones, Urine NEGATIVE NEGATIVE mg/dL    Bilirubin, Urine NEGATIVE NEGATIVE mg/dL    Urobilinogen, Urine Normal Normal mg/dL    Nitrite, Urine NEGATIVE NEGATIVE    Leukocyte Esterase, Urine NEGATIVE NEGATIVE     .    Please see MARIBELL note for further details    Sections of this report were created using voice-to-text technology and may contain errors in translation    Andrew Esteves DO  Emergency Medicine       Andrew Esteves DO  05/17/25 0689

## 2025-05-16 NOTE — ED PROVIDER NOTES
HPI   Chief Complaint   Patient presents with    Speech Problem     Pt states he has had slurred speech for multiple months.  Has difficulty breathing with light exertion.        Patient is a 65-year-old male presenting to the emergency department for evaluation of generalized weakness and slurred speech.  Patient states for the past few weeks he has been feeling off.  He states he went to work today and they told him that he seemed like he was slurring his speech.  His boss therefore drove him here to be further evaluated.  He states just feels tired and weak.  He denies any chest pain, shortness of breath, fevers, chills, nausea, vomiting, abdominal pain.  He denies any history of stroke.  He denies any one-sided weakness.  He denies any visual changes or headaches.  He denies any head injury.              Patient History   Medical History[1]  Surgical History[2]  Family History[3]  Social History[4]    Physical Exam   ED Triage Vitals [05/16/25 1028]   Temperature Heart Rate Respirations BP   36.9 °C (98.4 °F) 70 20 (!) 154/101      Pulse Ox Temp src Heart Rate Source Patient Position   100 % -- -- --      BP Location FiO2 (%)     -- --       Physical Exam  Vitals and nursing note reviewed.   Constitutional:       General: He is not in acute distress.     Appearance: Normal appearance. He is not ill-appearing or toxic-appearing.   HENT:      Head: Normocephalic and atraumatic.      Nose: Nose normal.      Mouth/Throat:      Mouth: Mucous membranes are moist.   Eyes:      Extraocular Movements: Extraocular movements intact.      Pupils: Pupils are equal, round, and reactive to light.   Cardiovascular:      Rate and Rhythm: Normal rate and regular rhythm.      Pulses: Normal pulses.      Heart sounds: Normal heart sounds.   Pulmonary:      Effort: Pulmonary effort is normal.      Breath sounds: Normal breath sounds. No wheezing, rhonchi or rales.   Abdominal:      Palpations: Abdomen is soft.      Tenderness: There  is no abdominal tenderness.   Musculoskeletal:         General: Normal range of motion.      Cervical back: Normal range of motion.   Skin:     General: Skin is warm and dry.   Neurological:      General: No focal deficit present.      Mental Status: He is alert and oriented to person, place, and time.      Sensory: No sensory deficit.      Motor: No weakness.   Psychiatric:         Mood and Affect: Mood normal.         Behavior: Behavior normal.           ED Course & MDM   ED Course as of 05/17/25 0037   Fri May 16, 2025   1045 EKG interpreted by myself independently, EKG shows normal sinus rhythm, rate 67 bpm, UT interval 196, , , QTc 418, patient has no ST elevation or depression, negative for acute MI. [STEPHANIE]   1255 Creatinine(!): 1.33  Consistent with previous labs [AJ]      ED Course User Index  [AJ] Yvette Medina PA-C  [STEPHANIE] Andrew Esteves, DO         Diagnoses as of 05/17/25 0037   Generalized weakness                 No data recorded     Dingess Coma Scale Score: 15 (05/16/25 1115 : Francisca Rivera RN)       NIH Stroke Scale: 0 (05/16/25 1430 : Yvette Medina PA-C)                   Medical Decision Making  **Disclaimer parts of this chart have been completed using voice recognition software. Please excuse any errors of transcription.     Patient seen in conjunction with attending physician Dr. Esteves.    HPI: Detailed above.    Exam: A medically appropriate exam performed, outlined above, given the known history and presentation.    History obtained from: Patient    EKG: Reviewed by myself.  Reviewed and interpreted by my attending physician.    Labs/Diagnostics:  Labs Reviewed   CBC WITH AUTO DIFFERENTIAL - Abnormal       Result Value    WBC 8.4      nRBC 0.0      RBC 4.61      Hemoglobin 13.5      Hematocrit 41.0      MCV 89      MCH 29.3      MCHC 32.9      RDW 15.0 (*)     Platelets 268      Neutrophils % 73.6      Immature Granulocytes %, Automated 1.0 (*)     Lymphocytes % 9.4      Monocytes %  10.1      Eosinophils % 5.1      Basophils % 0.8      Neutrophils Absolute 6.19      Immature Granulocytes Absolute, Automated 0.08      Lymphocytes Absolute 0.79 (*)     Monocytes Absolute 0.85      Eosinophils Absolute 0.43      Basophils Absolute 0.07     COMPREHENSIVE METABOLIC PANEL - Abnormal    Glucose 97      Sodium 139      Potassium 3.9      Chloride 106      Bicarbonate 27      Anion Gap 10      Urea Nitrogen 14      Creatinine 1.33 (*)     eGFR 59 (*)     Calcium 9.3      Albumin 4.0      Alkaline Phosphatase 121      Total Protein 6.6      AST 14      Bilirubin, Total 0.6      ALT 15     MAGNESIUM - Normal    Magnesium 2.07     B-TYPE NATRIURETIC PEPTIDE - Normal    BNP 79      Narrative:        <100 pg/mL - Heart failure unlikely  100-299 pg/mL - Intermediate probability of acute heart                  failure exacerbation. Correlate with clinical                  context and patient history.    >=300 pg/mL - Heart Failure likely. Correlate with clinical                  context and patient history.    BNP testing is performed using different testing methodology at [facility] than at other [facility]. Direct result comparisons should only be made within the same method.      SARS-COV-2, INFLUENZA A/B AND RSV PCR - Normal    Coronavirus 2019, PCR Not Detected      Flu A Result Not Detected      Flu B Result Not Detected      RSV PCR Not Detected      Narrative:     This assay is an FDA-cleared, in vitro diagnostic nucleic acid amplification test for the qualitative detection and differentiation of SARS CoV-2/ Influenza A/B/ RSV from nasopharyngeal specimens collected from individuals with signs and symptoms of respiratory tract infections, and has been validated for use at [facility]. Negative results do not preclude COVID-19/ Influenza A/B/ RSV infections and should not be used as the sole basis for diagnosis, treatment, or other management decisions. Testing  for SARS CoV-2 is recommended only for patients who meet current clinical and/or epidemiological criteria defined by federal, state, or local public health directives.   URINALYSIS WITH REFLEX CULTURE AND MICROSCOPIC - Normal    Color, Urine Light-Yellow      Appearance, Urine Clear      Specific Gravity, Urine 1.021      pH, Urine 6.0      Protein, Urine NEGATIVE      Glucose, Urine Normal      Blood, Urine NEGATIVE      Ketones, Urine NEGATIVE      Bilirubin, Urine NEGATIVE      Urobilinogen, Urine Normal      Nitrite, Urine NEGATIVE      Leukocyte Esterase, Urine NEGATIVE     SERIAL TROPONIN-INITIAL - Normal    Troponin I, High Sensitivity 12      Narrative:     Less than 99th percentile of normal range cutoff-  Female and children under 18 years old <14 ng/L; Male <21 ng/L: Negative  Repeat testing should be performed if clinically indicated.     Female and children under 18 years old 14-50 ng/L; Male 21-50 ng/L:  Consistent with possible cardiac damage and possible increased clinical   risk. Serial measurements may help to assess extent of myocardial damage.     >50 ng/L: Consistent with cardiac damage, increased clinical risk and  myocardial infarction. Serial measurements may help assess extent of   myocardial damage.      NOTE: Children less than 1 year old may have higher baseline troponin   levels and results should be interpreted in conjunction with the overall   clinical context.     NOTE: Troponin I testing is performed using a different   testing methodology at [facility] than at other   [facility]. Direct result comparisons should only   be made within the same method.   SERIAL TROPONIN, 1 HOUR - Normal    Troponin I, High Sensitivity 11      Narrative:     Less than 99th percentile of normal range cutoff-  Female and children under 18 years old <14 ng/L; Male <21 ng/L: Negative  Repeat testing should be performed if clinically indicated.     Female and children under 18 years old  14-50 ng/L; Male 21-50 ng/L:  Consistent with possible cardiac damage and possible increased clinical   risk. Serial measurements may help to assess extent of myocardial damage.     >50 ng/L: Consistent with cardiac damage, increased clinical risk and  myocardial infarction. Serial measurements may help assess extent of   myocardial damage.      NOTE: Children less than 1 year old may have higher baseline troponin   levels and results should be interpreted in conjunction with the overall   clinical context.     NOTE: Troponin I testing is performed using a different   testing methodology at [facility] than at other   [facility]. Direct result comparisons should only   be made within the same method.   TROPONIN SERIES- (INITIAL, 1 HR)    Narrative:     The following orders were created for panel order Troponin I Series, High Sensitivity (0, 1 HR).  Procedure                               Abnormality         Status                     ---------                               -----------         ------                     Troponin I, High Sensiti...[[id number]]  Normal              Final result               Troponin, High Sensitivi...[[id number]]  Normal              Final result                 Please view results for these tests on the individual orders.   URINALYSIS WITH REFLEX CULTURE AND MICROSCOPIC    Narrative:     The following orders were created for panel order Urinalysis with Reflex Culture and Microscopic.  Procedure                               Abnormality         Status                     ---------                               -----------         ------                     Urinalysis with Reflex C...[[id number]]  Normal              Final result               Extra Urine Gray Tube[[id number]]                                                         Please view results for these tests on the individual orders.   EXTRA URINE GRAY TUBE     CT head wo IV contrast   Final Result   No evidence  of acute cortical infarct or intracranial hemorrhage.        Chronic changes as described.        MACRO:   None        Signed by: Ema Mcmahon 5/16/2025 12:08 PM   Dictation workstation:   [id number]      CT soft tissue neck w IV contrast   Final Result   No evidence of bulky or otherwise clearly pathologic cervical   adenopathy or a soft tissue mass in the neck.        Signed by: Ger Asencio 5/16/2025 12:07 PM   Dictation workstation:   [id number]      XR chest 1 view   Final Result   1.  No evidence of acute cardiopulmonary process.                  MACRO:   None.        Signed by: Jhonathan Alfredo 5/16/2025 11:16 AM   Dictation workstation:   [id number]        EMERGENCY DEPARTMENT COURSE and DIFFERENTIAL DIAGNOSIS/MDM:  Patient is a 65-year-old male with a history of coronary artery disease, hyperlipidemia, ischemic cardiomyopathy, bicuspid AV with severe stenosis, and hypertension presenting to the emergency department for evaluation of generalized weakness and slurred speech.  On physical exam vital signs remarkable for hypertension but otherwise stable patient is in no acute distress.  NIH 0.  Patient not actively slurring speech at this time.  He states symptoms have been going on for weeks.  Code brain attack was not called.  Diagnostic labs ordered as well as CT of the head and chest x-ray.  Urine showed no evidence of infection.  Patient tested negative for COVID and flu.  Initial troponin 12 and repeat troponin 11 therefore low sufficient for ACS.  BNP normal therefore low sufficient for CHF.  Magnesium normal.  CMP showed a creatinine of 1.33 and EGFR 59 which is consistent with patient's previous labs.  CBC showed no leukocytosis or anemia.  CT of the head showed no acute intracranial hemorrhage or cortical infarct.  CTA soft tissue neck was normal.  Chest x-ray showed no evidence of acute cardiopulmonary process with no pneumonia, pneumothorax, cardiomegaly.  Patient ambulated in the emergency  department without any difficulty.  At this time I feel patient can be discharged and follow-up outpatient with neurology.  Patient symptoms have been going on for weeks/months and workup is essentially negative at this time.  Patient was comfortable with this plan.  Patient was discharged in stable condition.  He will return to the emergency department with any new or worsening symptoms.    The patient presented with a chief complaint of slurred speech and weakness. The differential diagnosis associated with this patient's presentation includes viral illness, CVA/TIA, ACS, dehydration, electrolyte abnormalities.     Vitals:    Vitals:    05/16/25 1028 05/16/25 1201 05/16/25 1412   BP: (!) 154/101 (!) 131/95    BP Location:  Left arm    Patient Position:  Sitting    Pulse: 70 61 64   Resp: 20 20 16   Temp: 36.9 °C (98.4 °F)     SpO2: 100% 97% 96%   Weight: 102 kg (223 lb 15.8 oz)     Height: 1.829 m (6')       History Limited by:    None    Independent history obtained from:    None    External records reviewed:    Outpatient Note cardiology note from 5/12/2025    Diagnostics interpreted by me:    CT Scan(s) see MDM and Xrays - see my independent interpretation in MDM    Discussions with other clinicians:    None    Chronic conditions impacting care:    Hypertension and Heart Disease    Social determinants of health affecting care:    None    Diagnostic tests considered but not performed: None    ED Medications managed:    Medications   iohexol (OMNIPaque) 350 mg iodine/mL solution 75 mL (75 mL intravenous Given 5/16/25 1153)       Prescription drugs considered:    None    Screenings:  NIH Stroke Scale: 0             Procedure  Procedures       [1] History reviewed. No pertinent past medical history.  [2]   Past Surgical History:  Procedure Laterality Date    CARDIAC CATHETERIZATION N/A 4/17/2025    Procedure: Left Heart Cath-Radial approach;  Surgeon: Adiel Rodríguez MD;  Location: [facility] Cardiac Cath Lab;  Service:  Cardiovascular;  Laterality: N/A;  LEFT HEART CATH THURS APRIL 17TH, 2025 AT 8 AM PT WILL ARRIVE AT 7:00 AM @ [facility] - DR. KIM  INSURANCE [employer] BC/BS INSURANCE AUTH# [id number] AUTH EXPIRES 6/30/2025 *Radial approach*    CT ABDOMEN PELVIS ANGIOGRAM W AND/OR WO IV CONTRAST  7/28/2015    CT ABDOMEN PELVIS ANGIOGRAM W AND/OR WO IV CONTRAST [facility]    CT ABDOMEN PELVIS ANGIOGRAM W AND/OR WO IV CONTRAST  11/7/2017    CT ABDOMEN PELVIS ANGIOGRAM W AND/OR WO IV CONTRAST [facility]GAC[facility]    CT ABDOMEN PELVIS ANGIOGRAM W AND/OR WO IV CONTRAST  3/24/2022    CT ABDOMEN PELVIS ANGIOGRAM W AND/OR WO IV CONTRAST [facility]    CT ANGIO AORTA AND BILATERAL ILIOFEMORAL RUN OFF INCLUDING WITHOUT CONTRAST IF PERFORMED  4/4/2022    CT AORTA AND BILATERAL ILIOFEMORAL RUNOFF ANGIOGRAM W AND/OR WO IV CONTRAST 4/4/2022 [facility]    CT ANGIO AORTA AND BILATERAL ILIOFEMORAL RUN OFF INCLUDING WITHOUT CONTRAST IF PERFORMED  9/12/2013    CT AORTA AND BILATERAL ILIOFEMORAL RUNOFF ANGIOGRAM W AND/OR WO IV CONTRAST [facility]    CT ANGIO AORTA AND BILATERAL ILIOFEMORAL RUN OFF INCLUDING WITHOUT CONTRAST IF PERFORMED  10/29/2013    CT AORTA AND BILATERAL ILIOFEMORAL RUNOFF ANGIOGRAM W AND/OR WO IV CONTRAST [facility]    CT ANGIO AORTA AND BILATERAL ILIOFEMORAL RUN OFF INCLUDING WITHOUT CONTRAST IF PERFORMED  10/5/2016    CT AORTA AND BILATERAL ILIOFEMORAL RUNOFF ANGIOGRAM W AND/OR WO IV CONTRAST [facility]    CT ANGIO AORTA AND BILATERAL ILIOFEMORAL RUN OFF INCLUDING WITHOUT CONTRAST IF PERFORMED  12/14/2016    CT AORTA AND BILATERAL ILIOFEMORAL RUNOFF ANGIOGRAM W AND/OR WO IV CONTRAST [facility]    CT PELVIS ANGIO W AND WO IV CONTRAST  9/1/2018    CT PELVIS ANGIO W AND WO IV CONTRAST [facility]    IR ANGIOGRAM LOWER EXTREMITY BILATERAL Bilateral 11/5/2015    IR ANGIOGRAM LOWER EXTREMITY BILATERAL [facility]    IR ANGIOGRAM LOWER EXTREMITY  LEFT Left 10/28/2015    IR ANGIOGRAM LOWER EXTREMITY LEFT [facility] INPATIENT LEGACY    IR ANGIOGRAM LOWER EXTREMITY LEFT Left 11/10/2017    IR ANGIOGRAM LOWER EXTREMITY LEFT [facility] INPATIENT LEGACY    IR ANGIOGRAM LOWER EXTREMITY LEFT Left 5/3/2019    IR ANGIOGRAM LOWER EXTREMITY LEFT [facility] INPATIENT LEGACY    MR ANGIO ABDOMEN W AND WO IV CONTRAST  2022    MR ABDOMEN ANGIO W AND WO IV CONTRAST [facility] INPATIENT LEGACY    OTHER SURGICAL HISTORY  2017    Angioplasty    OTHER SURGICAL HISTORY  2017    Creation Of Pericardial Window    US GUIDED ABSCESS DRAIN  2022    US GUIDED ABSCESS DRAIN [facility] INPATIENT LEGACY   [3] No family history on file.  [4]   Social History  Tobacco Use    Smoking status: Former     Current packs/day: 0.00     Types: Cigarettes     Quit date: 2024     Years since quittin.4    Smokeless tobacco: Never   Substance Use Topics    Alcohol use: Never    Drug use: Never        Yvette Medina PA-C  25 0037

## 2025-05-17 LAB
ATRIAL RATE: 67 BPM
P AXIS: 17 DEGREES
P OFFSET: 181 MS
P ONSET: 117 MS
PR INTERVAL: 196 MS
Q ONSET: 215 MS
QRS COUNT: 11 BEATS
QRS DURATION: 110 MS
QT INTERVAL: 396 MS
QTC CALCULATION(BAZETT): 418 MS
QTC FREDERICIA: 411 MS
R AXIS: 7 DEGREES
T AXIS: 10 DEGREES
T OFFSET: 413 MS
VENTRICULAR RATE: 67 BPM

## 2025-05-17 NOTE — PROGRESS NOTES
[facility]ve [facility] Structural Heart Multidisciplinary Meeting   This note reflects a summary of a case conference discussion between a multidisciplinary team of interventional cardiologists, cardiac surgeons, APPs, nurse navigators, and research team.  The suggestions and impressions in this note reflect group consensus opinion but do not substitute bedside evaluation and management by the primary team.     Attendees:  Dr. Dayo Storey, Dr. Siegel, Dr. Botello, Dr. Reno, Renato Meier, Dr. Herman Maloney, Dr. Urias, Dr. Jamison, Dr. Martinez, Valarie Nathan, Courtney Darling, Neida Johnson, Dr. Perkins, Glory Baldwin, Dr. Alvarado.    Dayo Quesada is a 65 y.o. year old male  Medical record number: [id number]    Referring Provider Dr. Jones    Brief Clinical Summary - clinical presentation/comorbidities:  65 y.o. y/o male with PMH of hypertension, Peripheral arterial disease with Bilateral atherosclerosis of legs, DVT, Hyperlipidemia, s/p colon cancer, presents for evaluation of severe, symptomatic aortic stenosis.  Patient relates history of worsening fatigue, SOB, CASH. Gradually doing less and less activity secondary to symptoms.   Valve and Structural Heart Work Up  - Echo: EF 48%, mod-severe aortic stenosis, AV mean gradient 38, AV Vmax 4.12, RAVEN 0.77   - EKG:NSR  - NYHA: 2  - LHC: 04/17/2025- RCA disease  - CT TAVR: 05/08/2025  Carotid US   - dental clearance: will need appointment  - EFT 0/5  - STS  Procedure Type: Isolated AVR  Perioperative OutcomeEstimate %  Operative Mortality1.03%  Need KCCQ/Walk    Group consensus recommendation:   long history of smoking, COPD, PVD.  Suspect carotid access vs Surgery. Would recommend surgery  for this patient. Will f/u with Dr. Sheldon.     To contact the  [facility] contact  [facility] or the office [phone].

## 2025-05-21 ENCOUNTER — APPOINTMENT (OUTPATIENT)
Dept: CARDIOLOGY | Facility: CLINIC | Age: 65
End: 2025-05-21
Payer: MEDICARE

## 2025-07-18 DIAGNOSIS — I25.10 CORONARY STENOSIS: ICD-10-CM

## 2025-07-18 DIAGNOSIS — I50.89 OTHER HEART FAILURE: ICD-10-CM

## 2025-07-18 DIAGNOSIS — I35.0 NONRHEUMATIC AORTIC (VALVE) STENOSIS: Primary | ICD-10-CM

## 2025-07-25 ENCOUNTER — APPOINTMENT (OUTPATIENT)
Dept: PULMONOLOGY | Facility: CLINIC | Age: 65
End: 2025-07-25
Payer: MEDICARE

## 2025-07-28 ENCOUNTER — APPOINTMENT (OUTPATIENT)
Dept: CARDIOLOGY | Facility: CLINIC | Age: 65
End: 2025-07-28
Payer: MEDICARE

## 2025-08-13 ENCOUNTER — APPOINTMENT (OUTPATIENT)
Dept: VASCULAR SURGERY | Facility: CLINIC | Age: 65
End: 2025-08-13
Payer: MEDICARE

## 2025-08-13 ENCOUNTER — APPOINTMENT (OUTPATIENT)
Dept: VASCULAR MEDICINE | Facility: CLINIC | Age: 65
End: 2025-08-13
Payer: MEDICARE

## (undated) DEVICE — CATHETER, INIFINITI, 5FR TG 4.0

## (undated) DEVICE — PAD, ELECTRODE DEFIB PADPRO ADULT STRL W/ADAPTER

## (undated) DEVICE — TR BAND, RADIAL COMPRESSION, STANDARD, 24CM

## (undated) DEVICE — INTRODUCER SHEATH, GLIDESHEATH, 6FR 25CM

## (undated) DEVICE — COVER, EQUIPMENT, ZERO GRAVITY

## (undated) DEVICE — GUIDEWIRE, J TIP, 3 MM, 0.035 IN X 260 CM, PTFE

## (undated) DEVICE — Device

## (undated) DEVICE — CATHETER, ANGIO, IMPULSE, FR4, 5 FR X 100 CM

## (undated) DEVICE — PACK, ANGIO P2, CUSTOM, LAKE

## (undated) DEVICE — KIT, NAMIC STANDARD LEFT HEART, CUSTOM, LWMC